# Patient Record
Sex: MALE | Race: WHITE | NOT HISPANIC OR LATINO | URBAN - METROPOLITAN AREA
[De-identification: names, ages, dates, MRNs, and addresses within clinical notes are randomized per-mention and may not be internally consistent; named-entity substitution may affect disease eponyms.]

---

## 2023-03-01 ENCOUNTER — OFFICE (OUTPATIENT)
Dept: URBAN - METROPOLITAN AREA CLINIC 26 | Facility: CLINIC | Age: 63
End: 2023-03-01

## 2023-03-01 VITALS
DIASTOLIC BLOOD PRESSURE: 70 MMHG | WEIGHT: 222 LBS | TEMPERATURE: 97.7 F | SYSTOLIC BLOOD PRESSURE: 134 MMHG | HEART RATE: 66 BPM | HEIGHT: 65 IN

## 2023-03-01 DIAGNOSIS — R11.0 NAUSEA: ICD-10-CM

## 2023-03-01 DIAGNOSIS — K62.5 HEMORRHAGE OF ANUS AND RECTUM: ICD-10-CM

## 2023-03-01 DIAGNOSIS — R19.4 CHANGE IN BOWEL HABIT: ICD-10-CM

## 2023-03-01 PROCEDURE — 99203 OFFICE O/P NEW LOW 30 MIN: CPT | Performed by: NURSE PRACTITIONER

## 2023-03-10 ENCOUNTER — OFFICE (OUTPATIENT)
Dept: URBAN - METROPOLITAN AREA PATHOLOGY 2 | Facility: PATHOLOGY | Age: 63
End: 2023-03-10

## 2023-03-10 ENCOUNTER — AMBULATORY SURGICAL CENTER (OUTPATIENT)
Dept: URBAN - METROPOLITAN AREA SURGERY 12 | Facility: SURGERY | Age: 63
End: 2023-03-10
Payer: COMMERCIAL

## 2023-03-10 ENCOUNTER — AMBULATORY SURGICAL CENTER (OUTPATIENT)
Dept: URBAN - METROPOLITAN AREA SURGERY 12 | Facility: SURGERY | Age: 63
End: 2023-03-10

## 2023-03-10 VITALS
RESPIRATION RATE: 13 BRPM | SYSTOLIC BLOOD PRESSURE: 104 MMHG | SYSTOLIC BLOOD PRESSURE: 151 MMHG | DIASTOLIC BLOOD PRESSURE: 57 MMHG | RESPIRATION RATE: 14 BRPM | DIASTOLIC BLOOD PRESSURE: 61 MMHG | DIASTOLIC BLOOD PRESSURE: 50 MMHG | DIASTOLIC BLOOD PRESSURE: 45 MMHG | DIASTOLIC BLOOD PRESSURE: 59 MMHG | OXYGEN SATURATION: 100 % | HEART RATE: 61 BPM | SYSTOLIC BLOOD PRESSURE: 110 MMHG | SYSTOLIC BLOOD PRESSURE: 138 MMHG | SYSTOLIC BLOOD PRESSURE: 120 MMHG | RESPIRATION RATE: 23 BRPM | RESPIRATION RATE: 23 BRPM | OXYGEN SATURATION: 92 % | HEART RATE: 60 BPM | DIASTOLIC BLOOD PRESSURE: 50 MMHG | DIASTOLIC BLOOD PRESSURE: 51 MMHG | OXYGEN SATURATION: 98 % | SYSTOLIC BLOOD PRESSURE: 132 MMHG | DIASTOLIC BLOOD PRESSURE: 80 MMHG | SYSTOLIC BLOOD PRESSURE: 150 MMHG | DIASTOLIC BLOOD PRESSURE: 62 MMHG | DIASTOLIC BLOOD PRESSURE: 57 MMHG | DIASTOLIC BLOOD PRESSURE: 59 MMHG | SYSTOLIC BLOOD PRESSURE: 138 MMHG | SYSTOLIC BLOOD PRESSURE: 107 MMHG | HEIGHT: 71 IN | RESPIRATION RATE: 18 BRPM | DIASTOLIC BLOOD PRESSURE: 61 MMHG | RESPIRATION RATE: 17 BRPM | SYSTOLIC BLOOD PRESSURE: 152 MMHG | HEART RATE: 62 BPM | RESPIRATION RATE: 25 BRPM | RESPIRATION RATE: 18 BRPM | OXYGEN SATURATION: 96 % | SYSTOLIC BLOOD PRESSURE: 141 MMHG | SYSTOLIC BLOOD PRESSURE: 157 MMHG | RESPIRATION RATE: 17 BRPM | SYSTOLIC BLOOD PRESSURE: 113 MMHG | RESPIRATION RATE: 10 BRPM | SYSTOLIC BLOOD PRESSURE: 132 MMHG | SYSTOLIC BLOOD PRESSURE: 102 MMHG | DIASTOLIC BLOOD PRESSURE: 50 MMHG | SYSTOLIC BLOOD PRESSURE: 110 MMHG | HEART RATE: 60 BPM | RESPIRATION RATE: 21 BRPM | SYSTOLIC BLOOD PRESSURE: 141 MMHG | OXYGEN SATURATION: 96 % | DIASTOLIC BLOOD PRESSURE: 47 MMHG | SYSTOLIC BLOOD PRESSURE: 150 MMHG | DIASTOLIC BLOOD PRESSURE: 65 MMHG | DIASTOLIC BLOOD PRESSURE: 64 MMHG | SYSTOLIC BLOOD PRESSURE: 104 MMHG | DIASTOLIC BLOOD PRESSURE: 70 MMHG | DIASTOLIC BLOOD PRESSURE: 65 MMHG | TEMPERATURE: 98.2 F | SYSTOLIC BLOOD PRESSURE: 152 MMHG | DIASTOLIC BLOOD PRESSURE: 61 MMHG | SYSTOLIC BLOOD PRESSURE: 102 MMHG | SYSTOLIC BLOOD PRESSURE: 107 MMHG | RESPIRATION RATE: 19 BRPM | RESPIRATION RATE: 21 BRPM | DIASTOLIC BLOOD PRESSURE: 65 MMHG | SYSTOLIC BLOOD PRESSURE: 124 MMHG | SYSTOLIC BLOOD PRESSURE: 101 MMHG | HEART RATE: 61 BPM | RESPIRATION RATE: 21 BRPM | SYSTOLIC BLOOD PRESSURE: 142 MMHG | RESPIRATION RATE: 18 BRPM | RESPIRATION RATE: 25 BRPM | DIASTOLIC BLOOD PRESSURE: 64 MMHG | DIASTOLIC BLOOD PRESSURE: 62 MMHG | DIASTOLIC BLOOD PRESSURE: 67 MMHG | DIASTOLIC BLOOD PRESSURE: 47 MMHG | DIASTOLIC BLOOD PRESSURE: 59 MMHG | SYSTOLIC BLOOD PRESSURE: 142 MMHG | RESPIRATION RATE: 11 BRPM | SYSTOLIC BLOOD PRESSURE: 152 MMHG | RESPIRATION RATE: 11 BRPM | SYSTOLIC BLOOD PRESSURE: 110 MMHG | HEART RATE: 60 BPM | RESPIRATION RATE: 24 BRPM | DIASTOLIC BLOOD PRESSURE: 53 MMHG | OXYGEN SATURATION: 92 % | DIASTOLIC BLOOD PRESSURE: 53 MMHG | RESPIRATION RATE: 28 BRPM | RESPIRATION RATE: 24 BRPM | RESPIRATION RATE: 16 BRPM | SYSTOLIC BLOOD PRESSURE: 151 MMHG | DIASTOLIC BLOOD PRESSURE: 51 MMHG | OXYGEN SATURATION: 97 % | HEART RATE: 61 BPM | SYSTOLIC BLOOD PRESSURE: 120 MMHG | DIASTOLIC BLOOD PRESSURE: 75 MMHG | OXYGEN SATURATION: 97 % | HEART RATE: 62 BPM | SYSTOLIC BLOOD PRESSURE: 164 MMHG | OXYGEN SATURATION: 100 % | SYSTOLIC BLOOD PRESSURE: 124 MMHG | RESPIRATION RATE: 14 BRPM | RESPIRATION RATE: 14 BRPM | SYSTOLIC BLOOD PRESSURE: 138 MMHG | DIASTOLIC BLOOD PRESSURE: 53 MMHG | DIASTOLIC BLOOD PRESSURE: 52 MMHG | SYSTOLIC BLOOD PRESSURE: 157 MMHG | SYSTOLIC BLOOD PRESSURE: 107 MMHG | SYSTOLIC BLOOD PRESSURE: 164 MMHG | OXYGEN SATURATION: 98 % | SYSTOLIC BLOOD PRESSURE: 157 MMHG | RESPIRATION RATE: 10 BRPM | DIASTOLIC BLOOD PRESSURE: 47 MMHG | TEMPERATURE: 98.2 F | OXYGEN SATURATION: 92 % | SYSTOLIC BLOOD PRESSURE: 101 MMHG | SYSTOLIC BLOOD PRESSURE: 151 MMHG | SYSTOLIC BLOOD PRESSURE: 104 MMHG | SYSTOLIC BLOOD PRESSURE: 124 MMHG | SYSTOLIC BLOOD PRESSURE: 164 MMHG | RESPIRATION RATE: 17 BRPM | DIASTOLIC BLOOD PRESSURE: 70 MMHG | RESPIRATION RATE: 23 BRPM | DIASTOLIC BLOOD PRESSURE: 55 MMHG | SYSTOLIC BLOOD PRESSURE: 120 MMHG | SYSTOLIC BLOOD PRESSURE: 142 MMHG | SYSTOLIC BLOOD PRESSURE: 141 MMHG | HEART RATE: 64 BPM | SYSTOLIC BLOOD PRESSURE: 146 MMHG | OXYGEN SATURATION: 99 % | RESPIRATION RATE: 10 BRPM | DIASTOLIC BLOOD PRESSURE: 67 MMHG | WEIGHT: 215 LBS | WEIGHT: 215 LBS | RESPIRATION RATE: 19 BRPM | RESPIRATION RATE: 28 BRPM | RESPIRATION RATE: 11 BRPM | WEIGHT: 215 LBS | DIASTOLIC BLOOD PRESSURE: 67 MMHG | DIASTOLIC BLOOD PRESSURE: 45 MMHG | DIASTOLIC BLOOD PRESSURE: 52 MMHG | OXYGEN SATURATION: 99 % | SYSTOLIC BLOOD PRESSURE: 132 MMHG | OXYGEN SATURATION: 98 % | RESPIRATION RATE: 28 BRPM | SYSTOLIC BLOOD PRESSURE: 150 MMHG | SYSTOLIC BLOOD PRESSURE: 146 MMHG | HEART RATE: 64 BPM | OXYGEN SATURATION: 99 % | DIASTOLIC BLOOD PRESSURE: 75 MMHG | TEMPERATURE: 98.2 F | DIASTOLIC BLOOD PRESSURE: 52 MMHG | OXYGEN SATURATION: 96 % | DIASTOLIC BLOOD PRESSURE: 64 MMHG | RESPIRATION RATE: 24 BRPM | OXYGEN SATURATION: 100 % | HEART RATE: 64 BPM | SYSTOLIC BLOOD PRESSURE: 101 MMHG | HEART RATE: 62 BPM | DIASTOLIC BLOOD PRESSURE: 57 MMHG | DIASTOLIC BLOOD PRESSURE: 55 MMHG | DIASTOLIC BLOOD PRESSURE: 62 MMHG | SYSTOLIC BLOOD PRESSURE: 113 MMHG | DIASTOLIC BLOOD PRESSURE: 70 MMHG | HEIGHT: 71 IN | SYSTOLIC BLOOD PRESSURE: 113 MMHG | SYSTOLIC BLOOD PRESSURE: 146 MMHG | RESPIRATION RATE: 16 BRPM | RESPIRATION RATE: 13 BRPM | DIASTOLIC BLOOD PRESSURE: 80 MMHG | DIASTOLIC BLOOD PRESSURE: 80 MMHG | HEIGHT: 71 IN | RESPIRATION RATE: 13 BRPM | DIASTOLIC BLOOD PRESSURE: 75 MMHG | OXYGEN SATURATION: 97 % | DIASTOLIC BLOOD PRESSURE: 55 MMHG | DIASTOLIC BLOOD PRESSURE: 51 MMHG | RESPIRATION RATE: 16 BRPM | RESPIRATION RATE: 25 BRPM | DIASTOLIC BLOOD PRESSURE: 45 MMHG | RESPIRATION RATE: 19 BRPM | SYSTOLIC BLOOD PRESSURE: 102 MMHG

## 2023-03-10 DIAGNOSIS — K57.30 DIVERTICULOSIS OF LARGE INTESTINE WITHOUT PERFORATION OR ABS: ICD-10-CM

## 2023-03-10 DIAGNOSIS — Z86.010 PERSONAL HISTORY OF COLONIC POLYPS: ICD-10-CM

## 2023-03-10 DIAGNOSIS — K64.8 OTHER HEMORRHOIDS: ICD-10-CM

## 2023-03-10 DIAGNOSIS — K22.70 BARRETT'S ESOPHAGUS WITHOUT DYSPLASIA: ICD-10-CM

## 2023-03-10 DIAGNOSIS — R11.0 NAUSEA: ICD-10-CM

## 2023-03-10 DIAGNOSIS — D12.0 BENIGN NEOPLASM OF CECUM: ICD-10-CM

## 2023-03-10 DIAGNOSIS — K31.89 OTHER DISEASES OF STOMACH AND DUODENUM: ICD-10-CM

## 2023-03-10 DIAGNOSIS — K62.5 HEMORRHAGE OF ANUS AND RECTUM: ICD-10-CM

## 2023-03-10 DIAGNOSIS — D12.5 BENIGN NEOPLASM OF SIGMOID COLON: ICD-10-CM

## 2023-03-10 DIAGNOSIS — K22.2 ESOPHAGEAL OBSTRUCTION: ICD-10-CM

## 2023-03-10 DIAGNOSIS — D12.2 BENIGN NEOPLASM OF ASCENDING COLON: ICD-10-CM

## 2023-03-10 DIAGNOSIS — K44.9 DIAPHRAGMATIC HERNIA WITHOUT OBSTRUCTION OR GANGRENE: ICD-10-CM

## 2023-03-10 PROBLEM — K63.5 POLYP OF COLON: Status: ACTIVE | Noted: 2023-03-10

## 2023-03-10 PROBLEM — K22.89 OTHER SPECIFIED DISEASE OF ESOPHAGUS: Status: ACTIVE | Noted: 2023-03-10

## 2023-03-10 PROCEDURE — 43450 DILATE ESOPHAGUS 1/MULT PASS: CPT | Performed by: INTERNAL MEDICINE

## 2023-03-10 PROCEDURE — 88305 TISSUE EXAM BY PATHOLOGIST: CPT | Performed by: PATHOLOGY

## 2023-03-10 PROCEDURE — 45380 COLONOSCOPY AND BIOPSY: CPT | Mod: 59 | Performed by: INTERNAL MEDICINE

## 2023-03-10 PROCEDURE — 43239 EGD BIOPSY SINGLE/MULTIPLE: CPT | Mod: 51 | Performed by: INTERNAL MEDICINE

## 2023-03-10 PROCEDURE — 45385 COLONOSCOPY W/LESION REMOVAL: CPT | Performed by: INTERNAL MEDICINE

## 2023-03-10 PROCEDURE — 88313 SPECIAL STAINS GROUP 2: CPT | Performed by: PATHOLOGY

## 2023-03-10 PROCEDURE — 88342 IMHCHEM/IMCYTCHM 1ST ANTB: CPT | Performed by: PATHOLOGY

## 2023-03-10 RX ORDER — SUCRALFATE 1 G/1
1 TABLET ORAL
Qty: 90 | Refills: 1 | Status: ACTIVE
Start: 2023-03-10

## 2023-08-10 LAB
ALANINE AMINOTRANSFERASE (SGPT) (U/L) IN SER/PLAS: 15 U/L (ref 10–52)
ALBUMIN (G/DL) IN SER/PLAS: 4.2 G/DL (ref 3.4–5)
ALKALINE PHOSPHATASE (U/L) IN SER/PLAS: 106 U/L (ref 33–136)
ANION GAP IN SER/PLAS: 13 MMOL/L (ref 10–20)
ASPARTATE AMINOTRANSFERASE (SGOT) (U/L) IN SER/PLAS: 17 U/L (ref 9–39)
BASOPHILS (10*3/UL) IN BLOOD BY AUTOMATED COUNT: 0.03 X10E9/L (ref 0–0.1)
BASOPHILS/100 LEUKOCYTES IN BLOOD BY AUTOMATED COUNT: 0.6 % (ref 0–2)
BILIRUBIN TOTAL (MG/DL) IN SER/PLAS: 0.6 MG/DL (ref 0–1.2)
CALCIUM (MG/DL) IN SER/PLAS: 9.9 MG/DL (ref 8.6–10.6)
CARBON DIOXIDE, TOTAL (MMOL/L) IN SER/PLAS: 27 MMOL/L (ref 21–32)
CHLORIDE (MMOL/L) IN SER/PLAS: 106 MMOL/L (ref 98–107)
CHOLESTEROL (MG/DL) IN SER/PLAS: 182 MG/DL (ref 0–199)
CHOLESTEROL IN HDL (MG/DL) IN SER/PLAS: 100.1 MG/DL
CHOLESTEROL/HDL RATIO: 1.8
CREATININE (MG/DL) IN SER/PLAS: 1.01 MG/DL (ref 0.5–1.3)
EOSINOPHILS (10*3/UL) IN BLOOD BY AUTOMATED COUNT: 0.27 X10E9/L (ref 0–0.7)
EOSINOPHILS/100 LEUKOCYTES IN BLOOD BY AUTOMATED COUNT: 5.2 % (ref 0–6)
ERYTHROCYTE DISTRIBUTION WIDTH (RATIO) BY AUTOMATED COUNT: 16.6 % (ref 11.5–14.5)
ERYTHROCYTE MEAN CORPUSCULAR HEMOGLOBIN CONCENTRATION (G/DL) BY AUTOMATED: 32.3 G/DL (ref 32–36)
ERYTHROCYTE MEAN CORPUSCULAR VOLUME (FL) BY AUTOMATED COUNT: 92 FL (ref 80–100)
ERYTHROCYTES (10*6/UL) IN BLOOD BY AUTOMATED COUNT: 4.86 X10E12/L (ref 4.5–5.9)
GFR MALE: 83 ML/MIN/1.73M2
GLUCOSE (MG/DL) IN SER/PLAS: 101 MG/DL (ref 74–99)
HEMATOCRIT (%) IN BLOOD BY AUTOMATED COUNT: 44.9 % (ref 41–52)
HEMOGLOBIN (G/DL) IN BLOOD: 14.5 G/DL (ref 13.5–17.5)
IMMATURE GRANULOCYTES/100 LEUKOCYTES IN BLOOD BY AUTOMATED COUNT: 0.2 % (ref 0–0.9)
LDL: 68 MG/DL (ref 0–99)
LEUKOCYTES (10*3/UL) IN BLOOD BY AUTOMATED COUNT: 5.2 X10E9/L (ref 4.4–11.3)
LYMPHOCYTES (10*3/UL) IN BLOOD BY AUTOMATED COUNT: 0.98 X10E9/L (ref 1.2–4.8)
LYMPHOCYTES/100 LEUKOCYTES IN BLOOD BY AUTOMATED COUNT: 19 % (ref 13–44)
MONOCYTES (10*3/UL) IN BLOOD BY AUTOMATED COUNT: 0.65 X10E9/L (ref 0.1–1)
MONOCYTES/100 LEUKOCYTES IN BLOOD BY AUTOMATED COUNT: 12.6 % (ref 2–10)
NEUTROPHILS (10*3/UL) IN BLOOD BY AUTOMATED COUNT: 3.21 X10E9/L (ref 1.2–7.7)
NEUTROPHILS/100 LEUKOCYTES IN BLOOD BY AUTOMATED COUNT: 62.4 % (ref 40–80)
NRBC (PER 100 WBCS) BY AUTOMATED COUNT: 0 /100 WBC (ref 0–0)
PLATELETS (10*3/UL) IN BLOOD AUTOMATED COUNT: 147 X10E9/L (ref 150–450)
POTASSIUM (MMOL/L) IN SER/PLAS: 5.1 MMOL/L (ref 3.5–5.3)
PROSTATE SPECIFIC AG (NG/ML) IN SER/PLAS: 0.72 NG/ML (ref 0–4)
PROTEIN TOTAL: 7 G/DL (ref 6.4–8.2)
SODIUM (MMOL/L) IN SER/PLAS: 141 MMOL/L (ref 136–145)
THYROTROPIN (MIU/L) IN SER/PLAS BY DETECTION LIMIT <= 0.05 MIU/L: 1.46 MIU/L (ref 0.44–3.98)
TRIGLYCERIDE (MG/DL) IN SER/PLAS: 68 MG/DL (ref 0–149)
UREA NITROGEN (MG/DL) IN SER/PLAS: 22 MG/DL (ref 6–23)
VLDL: 14 MG/DL (ref 0–40)

## 2023-11-06 ENCOUNTER — HOSPITAL ENCOUNTER (OUTPATIENT)
Dept: CARDIOLOGY | Facility: CLINIC | Age: 63
Discharge: HOME | End: 2023-11-06
Payer: MEDICARE

## 2023-11-06 DIAGNOSIS — I44.2 CHB (COMPLETE HEART BLOCK) (MULTI): ICD-10-CM

## 2023-11-06 DIAGNOSIS — Z95.0 CARDIAC PACEMAKER IN SITU: ICD-10-CM

## 2023-11-08 ENCOUNTER — HOSPITAL ENCOUNTER (OUTPATIENT)
Dept: CARDIOLOGY | Facility: CLINIC | Age: 63
Discharge: HOME | End: 2023-11-08
Payer: MEDICARE

## 2023-11-08 DIAGNOSIS — Z95.0 PRESENCE OF CARDIAC PACEMAKER: ICD-10-CM

## 2023-11-08 PROCEDURE — 93280 PM DEVICE PROGR EVAL DUAL: CPT | Performed by: INTERNAL MEDICINE

## 2023-11-08 PROCEDURE — 93290 INTERROG DEV EVAL ICPMS IP: CPT | Performed by: INTERNAL MEDICINE

## 2023-11-08 PROCEDURE — 93290 INTERROG DEV EVAL ICPMS IP: CPT

## 2023-12-14 ENCOUNTER — HOSPITAL ENCOUNTER (OUTPATIENT)
Dept: RADIOLOGY | Facility: HOSPITAL | Age: 63
Discharge: HOME | End: 2023-12-14
Payer: MEDICARE

## 2023-12-14 DIAGNOSIS — R52 PAIN, UNSPECIFIED: ICD-10-CM

## 2023-12-14 PROCEDURE — 73502 X-RAY EXAM HIP UNI 2-3 VIEWS: CPT | Mod: RIGHT SIDE | Performed by: RADIOLOGY

## 2023-12-14 PROCEDURE — 73502 X-RAY EXAM HIP UNI 2-3 VIEWS: CPT | Mod: RT

## 2023-12-21 PROBLEM — R59.0 MEDIASTINAL LYMPHADENOPATHY: Status: ACTIVE | Noted: 2023-12-21

## 2023-12-21 PROBLEM — I71.20 THORACIC AORTIC ANEURYSM (CMS-HCC): Status: ACTIVE | Noted: 2023-12-21

## 2023-12-21 PROBLEM — I26.99 PULMONARY EMBOLISM WITHOUT ACUTE COR PULMONALE (MULTI): Status: ACTIVE | Noted: 2023-12-21

## 2023-12-21 PROBLEM — K21.9 GERD (GASTROESOPHAGEAL REFLUX DISEASE): Status: ACTIVE | Noted: 2023-12-21

## 2023-12-21 PROBLEM — I44.2 COMPLETE HEART BLOCK (MULTI): Status: ACTIVE | Noted: 2023-12-21

## 2023-12-21 PROBLEM — I35.0 AORTIC STENOSIS: Chronic | Status: ACTIVE | Noted: 2023-12-21

## 2023-12-21 PROBLEM — Z95.0 PACEMAKER: Chronic | Status: ACTIVE | Noted: 2023-12-21

## 2023-12-21 PROBLEM — I71.20 THORACIC AORTIC ANEURYSM (CMS-HCC): Chronic | Status: ACTIVE | Noted: 2023-12-21

## 2023-12-21 PROBLEM — I35.0 MILD AORTIC STENOSIS: Status: ACTIVE | Noted: 2023-12-21

## 2023-12-21 PROBLEM — I26.99 PULMONARY EMBOLISM WITHOUT ACUTE COR PULMONALE (MULTI): Chronic | Status: ACTIVE | Noted: 2023-12-21

## 2023-12-21 PROBLEM — I44.7 LEFT BUNDLE BRANCH BLOCK (LBBB): Status: ACTIVE | Noted: 2023-12-21

## 2023-12-21 PROBLEM — Z95.0 PACEMAKER: Status: ACTIVE | Noted: 2023-12-21

## 2024-01-03 ENCOUNTER — TELEPHONE (OUTPATIENT)
Dept: CARDIOLOGY | Facility: CLINIC | Age: 64
End: 2024-01-03

## 2024-01-03 ENCOUNTER — OFFICE VISIT (OUTPATIENT)
Dept: CARDIOLOGY | Facility: CLINIC | Age: 64
End: 2024-01-03
Payer: MEDICARE

## 2024-01-03 VITALS
OXYGEN SATURATION: 94 % | BODY MASS INDEX: 33.29 KG/M2 | DIASTOLIC BLOOD PRESSURE: 82 MMHG | SYSTOLIC BLOOD PRESSURE: 138 MMHG | WEIGHT: 232 LBS | HEART RATE: 83 BPM

## 2024-01-03 DIAGNOSIS — I71.21 ANEURYSM OF ASCENDING AORTA WITHOUT RUPTURE (CMS-HCC): Primary | Chronic | ICD-10-CM

## 2024-01-03 DIAGNOSIS — I44.2 COMPLETE HEART BLOCK (MULTI): Chronic | ICD-10-CM

## 2024-01-03 DIAGNOSIS — I10 BENIGN HYPERTENSION: Chronic | ICD-10-CM

## 2024-01-03 DIAGNOSIS — Z95.0 PACEMAKER: Chronic | ICD-10-CM

## 2024-01-03 DIAGNOSIS — R60.0 BILATERAL LEG EDEMA: Chronic | ICD-10-CM

## 2024-01-03 PROBLEM — I44.7 LEFT BUNDLE BRANCH BLOCK (LBBB): Chronic | Status: ACTIVE | Noted: 2023-12-21

## 2024-01-03 PROCEDURE — 3079F DIAST BP 80-89 MM HG: CPT | Performed by: INTERNAL MEDICINE

## 2024-01-03 PROCEDURE — 99214 OFFICE O/P EST MOD 30 MIN: CPT | Performed by: INTERNAL MEDICINE

## 2024-01-03 PROCEDURE — 3075F SYST BP GE 130 - 139MM HG: CPT | Performed by: INTERNAL MEDICINE

## 2024-01-03 PROCEDURE — 1159F MED LIST DOCD IN RCRD: CPT | Performed by: INTERNAL MEDICINE

## 2024-01-03 PROCEDURE — 93000 ELECTROCARDIOGRAM COMPLETE: CPT | Performed by: INTERNAL MEDICINE

## 2024-01-03 PROCEDURE — 1036F TOBACCO NON-USER: CPT | Performed by: INTERNAL MEDICINE

## 2024-01-03 PROCEDURE — 1160F RVW MEDS BY RX/DR IN RCRD: CPT | Performed by: INTERNAL MEDICINE

## 2024-01-03 RX ORDER — HYDROCHLOROTHIAZIDE 12.5 MG/1
12.5 TABLET ORAL DAILY
Qty: 90 TABLET | Refills: 3 | Status: SHIPPED | OUTPATIENT
Start: 2024-01-03

## 2024-01-03 RX ORDER — DICLOFENAC SODIUM 75 MG/1
75 TABLET, DELAYED RELEASE ORAL 2 TIMES DAILY
COMMUNITY
Start: 2024-01-02 | End: 2024-01-22

## 2024-01-03 RX ORDER — LOSARTAN POTASSIUM 100 MG/1
100 TABLET ORAL DAILY
COMMUNITY
Start: 2023-10-30

## 2024-01-03 RX ORDER — ASPIRIN 81 MG/1
81 TABLET ORAL ONCE
COMMUNITY

## 2024-01-03 RX ORDER — AMLODIPINE BESYLATE 10 MG/1
10 TABLET ORAL DAILY
COMMUNITY
Start: 2023-10-30

## 2024-01-03 RX ORDER — HYDROCHLOROTHIAZIDE 12.5 MG/1
12.5 TABLET ORAL DAILY
COMMUNITY
End: 2024-01-03 | Stop reason: SDUPTHER

## 2024-01-03 RX ORDER — PANTOPRAZOLE SODIUM 40 MG/1
40 TABLET, DELAYED RELEASE ORAL DAILY
COMMUNITY
Start: 2023-10-27

## 2024-01-03 NOTE — TELEPHONE ENCOUNTER
Patient was seen by Dr. Tripathi today and patient states that hydrochlorothiazide was going to be called into the pharmacy and nothing has been sent.  It is in the note but not in the med list.

## 2024-01-03 NOTE — PATIENT INSTRUCTIONS
1. Complete heart block. Left bundle branch block. Status post pacemaker placement 2019. Doing well. Followed by Dr. Ramicone.     2. Thoracic aortic aneurysm. 3.9 cm on the CT scan 4.2 cm on echo.  His repeat echo 2021 September revealed a 4 cm aneurysm.  This should not be repeated sometime this year.      3. Aortic stenosis. Mostly sclerosis. Reassess on his echo now.  On examination this is minimal     4. Hyperlipidemia. Followed by Dr. Agarwal.  8/10/2023 LDL 68  triglycerides 68     5. Hypertension. Well-controlled with losartan.  He is also on amlodipine.  He does have lower extremity edema.  This is likely combination of weight and amlodipine which she was not on when I had seen him in 2021.  Adding a diuretic such as hydrochlorothiazide would be reasonable.     6. Pulmonary embolism. This was discovered incidentally in March 2019 on a CT scan ordered by pulmonary medicine.  Currently off of Xarelto per pulmonary     EKG today.  Echo sometime in the near future.  He will call 1 week afterwards to discuss results.  Add hydrochlorothiazide 12.5 mg to his regimen.  BMP 1 month.  MARISEL follow-up 2 months.  See me back 1 year.

## 2024-01-03 NOTE — PROGRESS NOTES
Referred by No ref. provider found    HPI I am seeing Terrance for the first time since August 2021.  He had issues with his back which is limited his mobility.  His weight is gone up approximately 20 pounds.  He has had no chest pain or pressure no significant palpitations.  Rare shortness of breath.  On exam he does have lower extremity edema which he was not aware of.    Past Medical History:  Problem List Items Addressed This Visit    None       Past Medical History:   Diagnosis Date    Aortic stenosis 12/21/2023    mild    Overweight     Overweight (BMI 25.0-29.9)    Pacemaker 12/21/2023    [employer] … implanted January 2019 for complete heart block    Personal history of other diseases of the digestive system     History of gastroesophageal reflux (GERD)    Personal history of other specified conditions     History of shortness of breath    Pulmonary embolism without acute cor pulmonale (CMS/[facility]) 12/21/2023    Found incidentally on CAT scan ordered by Pulmonary    Thoracic aortic aneurysm (CMS/[facility]) 12/21/2023    4 cm     Thoracic aortic aneurysm, without rupture, unspecified (CMS/[facility]) 08/26/2021    Thoracic aortic aneurysm             Past Surgical History:  He has a past surgical history that includes Appendectomy (10/02/2017) and Other surgical history (08/12/2021).      Social History:  He has no history on file for tobacco use, alcohol use, and drug use.    Family History:  No family history on file.     Allergies:  Patient has no known allergies.    Outpatient Medications:  No current outpatient medications     Last Recorded Vitals:  There were no vitals filed for this visit.    Physical Exam    Physical  Patient is alert and oriented x3.  HEENT is unremarkable mucous members are moist  Neck no JVP no bruits upstrokes are full no thyromegaly  Lungs are clear bilaterally.  No wheezing crackles or rales  Heart regular rhythm normal S1-S2 there is no S3 no murmurs are heard.  Abdomen is soft vessels are positive  nontender nondistended no organomegaly no pulsatile masses  Extremities have 1+ edema.  Distal pulses present palpable.  Neuro is grossly nonfocal  Skin has no rashes     Last Labs:  CBC -  Lab Results   Component Value Date    WBC 5.2 08/10/2023    HGB 14.5 08/10/2023    HCT 44.9 08/10/2023    MCV 92 08/10/2023     (L) 08/10/2023       CMP -  Lab Results   Component Value Date    CALCIUM 9.9 08/10/2023    PHOS 3.3 01/26/2019    PROT 7.0 08/10/2023    ALBUMIN 4.2 08/10/2023    AST 17 08/10/2023    ALT 15 08/10/2023    ALKPHOS 106 08/10/2023    BILITOT 0.6 08/10/2023       LIPID PANEL -   Lab Results   Component Value Date    CHOL 182 08/10/2023    .1 08/10/2023    CHHDL 1.8 08/10/2023    VLDL 14 08/10/2023    TRIG 68 08/10/2023       RENAL FUNCTION PANEL -   Lab Results   Component Value Date    K 5.1 08/10/2023    PHOS 3.3 01/26/2019       Lab Results   Component Value Date     (H) 01/26/2019     Procedure    Echo 9/14/2021 EF 50%, DDF, mild pulm hypertension, thoracic aorta 4.1 cm    VENOUS U/S [03/08/2019]: Negative for DVT     CTA (03/07/2019): Pulm embolism w/in subsegmental branch of R-lower lobe pulm. artery, New from (1/26/19) New subsegmental 3-in-bud ground glass opacities in L-lung suspicious for developing viral/atypical pneumonia. Status post L-chest wall pacemaker/AICD w/out definitive evidence Pulm arterial pressure. Stable nonspecific mediastinal/hilar adenopathy.     ECHO (01/28/2019): Normal LVF 55-60% EF. Mildly elev RVSP. AR measures 3.7 cm. Arterial septal aneurysm prsent.     CTA [01/26/2019]: Aorta 3.9 cm, persistent mediastinal lymphadenopathy     ECHO [09/2017]: EF 50%, aortic sclerosis, aorta 4.2 cm     ECHO [01/17/2014]: Normal left ventricular size with concentric left ventricular hypertrophy and normal left ventricular systolic function. Estimated left ventricular ejection fraction is 60%. Mild left atrial enlargement. Mild aortic stenosis with a trileaflet aortic  valve with trace regurgitation. Dilated ascending aorta, measuring 4.3 centimeters. Mild pulmonary hypertension.     EX NST [09/26/2014]: 10 min 56 sec (13.2 METs) ... Normal, EF 54%          Assessment/Plan   1. Complete heart block. Left bundle branch block. Status post pacemaker placement 2019. Doing well. Followed by Dr. Ramicone.     2. Thoracic aortic aneurysm. 3.9 cm on the CT scan 4.2 cm on echo.  His repeat echo 2021 September revealed a 4 cm aneurysm.  This should not be repeated sometime this year.      3. Aortic stenosis. Mostly sclerosis. Reassess on his echo now.  On examination this is minimal     4. Hyperlipidemia. Followed by Dr. Agarwal.  8/10/2023 LDL 68  triglycerides 68     5. Hypertension. Well-controlled with losartan.  He is also on amlodipine.  He does have lower extremity edema.  This is likely combination of weight and amlodipine which she was not on when I had seen him in 2021.  Adding a diuretic such as hydrochlorothiazide would be reasonable.     6. Pulmonary embolism. This was discovered incidentally in March 2019 on a CT scan ordered by pulmonary medicine.  Currently off of Xarelto per pulmonary     EKG today.  Echo sometime in the near future.  He will call 1 week afterwards to discuss results.  Add hydrochlorothiazide 12.5 mg to his regimen.  BMP 1 month.  MARISEL follow-up 2 months.  See me back 1 year.    Nikos Tripathi MD     Instructions and follow up

## 2024-01-16 ENCOUNTER — HOSPITAL ENCOUNTER (OUTPATIENT)
Dept: CARDIOLOGY | Facility: CLINIC | Age: 64
Discharge: HOME | End: 2024-01-16
Payer: MEDICARE

## 2024-01-16 VITALS
WEIGHT: 212 LBS | HEIGHT: 70 IN | DIASTOLIC BLOOD PRESSURE: 82 MMHG | SYSTOLIC BLOOD PRESSURE: 138 MMHG | BODY MASS INDEX: 30.35 KG/M2

## 2024-01-16 DIAGNOSIS — R60.0 BILATERAL LEG EDEMA: Chronic | ICD-10-CM

## 2024-01-16 DIAGNOSIS — I71.21 ANEURYSM OF ASCENDING AORTA WITHOUT RUPTURE (CMS-HCC): Chronic | ICD-10-CM

## 2024-01-16 DIAGNOSIS — I71.20 THORACIC AORTIC ANEURYSM, WITHOUT RUPTURE, UNSPECIFIED (CMS-HCC): ICD-10-CM

## 2024-01-16 DIAGNOSIS — I10 BENIGN HYPERTENSION: Chronic | ICD-10-CM

## 2024-01-16 PROCEDURE — 93306 TTE W/DOPPLER COMPLETE: CPT

## 2024-01-16 PROCEDURE — 93306 TTE W/DOPPLER COMPLETE: CPT | Performed by: INTERNAL MEDICINE

## 2024-01-18 LAB
AORTIC VALVE MEAN GRADIENT: 2.9
AORTIC VALVE PEAK VELOCITY: 1.22
AV PEAK GRADIENT: 5.9
AVA (PEAK VEL): 2.21
AVA (VTI): 2.56
EJECTION FRACTION APICAL 4 CHAMBER: 58.5
EJECTION FRACTION: 61
LEFT ATRIUM VOLUME AREA LENGTH INDEX BSA: 32.3
LEFT VENTRICLE INTERNAL DIMENSION DIASTOLE: 6.24 (ref 3.5–6)
LEFT VENTRICULAR OUTFLOW TRACT DIAMETER: 2.19
MITRAL VALVE E/A RATIO: 0.66
RIGHT VENTRICLE FREE WALL PEAK S': 0.2
RIGHT VENTRICLE PEAK SYSTOLIC PRESSURE: 42.8
TRICUSPID ANNULAR PLANE SYSTOLIC EXCURSION: 3.5

## 2024-01-24 ENCOUNTER — APPOINTMENT (OUTPATIENT)
Dept: CARDIOLOGY | Facility: CLINIC | Age: 64
End: 2024-01-24
Payer: MEDICARE

## 2024-02-07 ENCOUNTER — APPOINTMENT (OUTPATIENT)
Dept: CARDIOLOGY | Facility: CLINIC | Age: 64
End: 2024-02-07
Payer: MEDICARE

## 2024-02-07 ENCOUNTER — LAB (OUTPATIENT)
Dept: LAB | Facility: LAB | Age: 64
End: 2024-02-07
Payer: MEDICARE

## 2024-02-07 DIAGNOSIS — I71.21 ANEURYSM OF ASCENDING AORTA WITHOUT RUPTURE (CMS-HCC): Chronic | ICD-10-CM

## 2024-02-07 DIAGNOSIS — I10 BENIGN HYPERTENSION: Chronic | ICD-10-CM

## 2024-02-07 DIAGNOSIS — R60.0 BILATERAL LEG EDEMA: Chronic | ICD-10-CM

## 2024-02-07 LAB
ANION GAP SERPL CALC-SCNC: 13 MMOL/L (ref 10–20)
BUN SERPL-MCNC: 21 MG/DL (ref 6–23)
CALCIUM SERPL-MCNC: 9.1 MG/DL (ref 8.6–10.3)
CHLORIDE SERPL-SCNC: 106 MMOL/L (ref 98–107)
CO2 SERPL-SCNC: 24 MMOL/L (ref 21–32)
CREAT SERPL-MCNC: 1.19 MG/DL (ref 0.5–1.3)
EGFRCR SERPLBLD CKD-EPI 2021: 68 ML/MIN/1.73M*2
GLUCOSE SERPL-MCNC: 97 MG/DL (ref 74–99)
POTASSIUM SERPL-SCNC: 4.4 MMOL/L (ref 3.5–5.3)
SODIUM SERPL-SCNC: 139 MMOL/L (ref 136–145)

## 2024-02-07 PROCEDURE — 80048 BASIC METABOLIC PNL TOTAL CA: CPT

## 2024-02-07 PROCEDURE — 36415 COLL VENOUS BLD VENIPUNCTURE: CPT

## 2024-02-21 ENCOUNTER — LAB (OUTPATIENT)
Dept: LAB | Facility: LAB | Age: 64
End: 2024-02-21
Payer: MEDICARE

## 2024-02-21 DIAGNOSIS — Z13.29 ENCOUNTER FOR SCREENING FOR OTHER SUSPECTED ENDOCRINE DISORDER: ICD-10-CM

## 2024-02-21 DIAGNOSIS — I10 ESSENTIAL (PRIMARY) HYPERTENSION: Primary | ICD-10-CM

## 2024-02-21 LAB
ALBUMIN SERPL BCP-MCNC: 4.2 G/DL (ref 3.4–5)
ALP SERPL-CCNC: 105 U/L (ref 33–136)
ALT SERPL W P-5'-P-CCNC: 19 U/L (ref 10–52)
ANION GAP SERPL CALC-SCNC: 17 MMOL/L (ref 10–20)
AST SERPL W P-5'-P-CCNC: 19 U/L (ref 9–39)
BASOPHILS # BLD AUTO: 0.03 X10*3/UL (ref 0–0.1)
BASOPHILS NFR BLD AUTO: 0.5 %
BILIRUB SERPL-MCNC: 0.9 MG/DL (ref 0–1.2)
BUN SERPL-MCNC: 20 MG/DL (ref 6–23)
CALCIUM SERPL-MCNC: 9.6 MG/DL (ref 8.6–10.6)
CHLORIDE SERPL-SCNC: 103 MMOL/L (ref 98–107)
CHOLEST SERPL-MCNC: 184 MG/DL (ref 0–199)
CHOLESTEROL/HDL RATIO: 2
CO2 SERPL-SCNC: 24 MMOL/L (ref 21–32)
CREAT SERPL-MCNC: 1.18 MG/DL (ref 0.5–1.3)
EGFRCR SERPLBLD CKD-EPI 2021: 68 ML/MIN/1.73M*2
EOSINOPHIL # BLD AUTO: 0.24 X10*3/UL (ref 0–0.7)
EOSINOPHIL NFR BLD AUTO: 3.7 %
ERYTHROCYTE [DISTWIDTH] IN BLOOD BY AUTOMATED COUNT: 14.3 % (ref 11.5–14.5)
GLUCOSE SERPL-MCNC: 90 MG/DL (ref 74–99)
HCT VFR BLD AUTO: 45.2 % (ref 41–52)
HDLC SERPL-MCNC: 91.8 MG/DL
HGB BLD-MCNC: 15.4 G/DL (ref 13.5–17.5)
IMM GRANULOCYTES # BLD AUTO: 0.03 X10*3/UL (ref 0–0.7)
IMM GRANULOCYTES NFR BLD AUTO: 0.5 % (ref 0–0.9)
LDLC SERPL CALC-MCNC: 79 MG/DL
LYMPHOCYTES # BLD AUTO: 0.9 X10*3/UL (ref 1.2–4.8)
LYMPHOCYTES NFR BLD AUTO: 14 %
MCH RBC QN AUTO: 32.2 PG (ref 26–34)
MCHC RBC AUTO-ENTMCNC: 34.1 G/DL (ref 32–36)
MCV RBC AUTO: 94 FL (ref 80–100)
MONOCYTES # BLD AUTO: 0.75 X10*3/UL (ref 0.1–1)
MONOCYTES NFR BLD AUTO: 11.6 %
NEUTROPHILS # BLD AUTO: 4.5 X10*3/UL (ref 1.2–7.7)
NEUTROPHILS NFR BLD AUTO: 69.7 %
NON HDL CHOLESTEROL: 92 MG/DL (ref 0–149)
NRBC BLD-RTO: 0 /100 WBCS (ref 0–0)
PLATELET # BLD AUTO: 157 X10*3/UL (ref 150–450)
POTASSIUM SERPL-SCNC: 4.8 MMOL/L (ref 3.5–5.3)
PROT SERPL-MCNC: 6.9 G/DL (ref 6.4–8.2)
RBC # BLD AUTO: 4.79 X10*6/UL (ref 4.5–5.9)
SODIUM SERPL-SCNC: 139 MMOL/L (ref 136–145)
TRIGL SERPL-MCNC: 65 MG/DL (ref 0–149)
TSH SERPL-ACNC: 1.97 MIU/L (ref 0.44–3.98)
VLDL: 13 MG/DL (ref 0–40)
WBC # BLD AUTO: 6.5 X10*3/UL (ref 4.4–11.3)

## 2024-02-21 PROCEDURE — 80053 COMPREHEN METABOLIC PANEL: CPT

## 2024-02-21 PROCEDURE — 80061 LIPID PANEL: CPT

## 2024-02-21 PROCEDURE — 36415 COLL VENOUS BLD VENIPUNCTURE: CPT

## 2024-02-21 PROCEDURE — 84443 ASSAY THYROID STIM HORMONE: CPT

## 2024-02-21 PROCEDURE — 85025 COMPLETE CBC W/AUTO DIFF WBC: CPT

## 2024-03-08 PROBLEM — M54.41 ACUTE RIGHT-SIDED LOW BACK PAIN WITH RIGHT-SIDED SCIATICA: Status: ACTIVE | Noted: 2024-01-10

## 2024-03-08 PROBLEM — M51.369 LUMBAR DEGENERATIVE DISC DISEASE: Status: ACTIVE | Noted: 2024-01-10

## 2024-03-08 PROBLEM — M51.36 LUMBAR DEGENERATIVE DISC DISEASE: Status: ACTIVE | Noted: 2024-01-10

## 2024-03-13 ENCOUNTER — OFFICE VISIT (OUTPATIENT)
Dept: CARDIOLOGY | Facility: CLINIC | Age: 64
End: 2024-03-13
Payer: MEDICARE

## 2024-03-13 ENCOUNTER — APPOINTMENT (OUTPATIENT)
Dept: CARDIOLOGY | Facility: CLINIC | Age: 64
End: 2024-03-13
Payer: MEDICARE

## 2024-03-13 VITALS
BODY MASS INDEX: 33 KG/M2 | DIASTOLIC BLOOD PRESSURE: 82 MMHG | HEART RATE: 72 BPM | OXYGEN SATURATION: 96 % | SYSTOLIC BLOOD PRESSURE: 122 MMHG | WEIGHT: 230 LBS

## 2024-03-13 DIAGNOSIS — I71.21 ANEURYSM OF ASCENDING AORTA WITHOUT RUPTURE (CMS-HCC): Primary | Chronic | ICD-10-CM

## 2024-03-13 DIAGNOSIS — I10 BENIGN HYPERTENSION: Chronic | ICD-10-CM

## 2024-03-13 DIAGNOSIS — I44.2 COMPLETE HEART BLOCK (MULTI): Chronic | ICD-10-CM

## 2024-03-13 DIAGNOSIS — I44.7 LEFT BUNDLE BRANCH BLOCK (LBBB): Chronic | ICD-10-CM

## 2024-03-13 PROCEDURE — 3074F SYST BP LT 130 MM HG: CPT | Performed by: PHYSICIAN ASSISTANT

## 2024-03-13 PROCEDURE — 99214 OFFICE O/P EST MOD 30 MIN: CPT | Performed by: PHYSICIAN ASSISTANT

## 2024-03-13 PROCEDURE — 1159F MED LIST DOCD IN RCRD: CPT | Performed by: PHYSICIAN ASSISTANT

## 2024-03-13 PROCEDURE — 1036F TOBACCO NON-USER: CPT | Performed by: PHYSICIAN ASSISTANT

## 2024-03-13 PROCEDURE — 1160F RVW MEDS BY RX/DR IN RCRD: CPT | Performed by: PHYSICIAN ASSISTANT

## 2024-03-13 PROCEDURE — 3079F DIAST BP 80-89 MM HG: CPT | Performed by: PHYSICIAN ASSISTANT

## 2024-03-13 NOTE — PROGRESS NOTES
Chief Complaint:   Aortic dilatation, complete heart block     History Of Present Illness:    Christopher Ralph is a 63 y.o. male presenting after recently completing a 2D echo displaying normal LV systolic function with moderately dilated LA, persistent mild dilatation of the thoracic aorta 4.3cm at largest diameter.  Patient denies chest pain, chest pressure, palpitations, dyspnea on exertion, shortness of breath at rest, diaphoresis, nausea/vomiting, back pain, headache, lightheadedness, dizziness, syncope or presyncopal episodes, active bleeding signs or symptoms, excessive weight gain, muscle or joint pain, claudication.       Last Recorded Vitals:  Vitals:    03/13/24 1418   BP: 122/82   BP Location: Right arm   Pulse: 72   SpO2: 96%   Weight: 104 kg (230 lb)       Past Medical History:  He has a past medical history of Aortic stenosis (12/21/2023), Bilateral leg edema (01/03/2024), Complete heart block (CMS/HCC) (12/21/2023), Left bundle branch block (LBBB) (12/21/2023), Overweight, Pacemaker (12/21/2023), Personal history of other diseases of the digestive system, Personal history of other specified conditions, Pulmonary embolism without acute cor pulmonale (CMS/HCC) (12/21/2023), Thoracic aortic aneurysm (CMS/HCC) (12/21/2023), and Thoracic aortic aneurysm, without rupture, unspecified (CMS/HCC) (08/26/2021).    Past Surgical History:  He has a past surgical history that includes Appendectomy (10/02/2017) and Other surgical history (08/12/2021).      Social History:  He reports that he quit smoking about 3 years ago. His smoking use included cigarettes. He has a 7.50 pack-year smoking history. He has been exposed to tobacco smoke. He has never used smokeless tobacco. No history on file for alcohol use and drug use.    Family History:  No family history on file.     Allergies:  Patient has no known allergies.    Outpatient Medications:  Current Outpatient Medications   Medication Instructions    amLODIPine  (NORVASC) 10 mg, oral, Daily    aspirin 81 mg, oral, Once    hydroCHLOROthiazide (MICROZIDE) 12.5 mg, oral, Daily    losartan (COZAAR) 100 mg, oral, Daily    pantoprazole (PROTONIX) 40 mg, oral, Daily       Physical Exam:  Constitutional: awake and alert, oriented ×3, no apparent distress  Skin: warm, dry, good turgor no obvious lesions  Eyes: pupils equal, round, reactive to light, conjunctiva pink and noninjected, no discharge  HENT: normocephalic and atraumatic, mucous membranes moist, trachea midline with no masses/goiter  Cardiovascular: S1/S2 regular, no murmur no rubs/gallops, no carotid bruits, no JVD  Pulmonary: symmetrical chest expansion, lungs are clear to auscultation bilaterally, no wheezes/rales/rhonchi, normal effort  Abdomen: nontender, nondistended, active bowel sounds, no ascites  Extremities: no cyanosis, clubbing, no LE edema no lesions; palpable pedal pulses  Neurologic: cranial nerves II - XII grossly intact, stable gait, no tremor       Last Labs:  CBC -  Lab Results   Component Value Date    WBC 6.5 02/21/2024    HGB 15.4 02/21/2024    HCT 45.2 02/21/2024    MCV 94 02/21/2024     02/21/2024       CMP -  Lab Results   Component Value Date    CALCIUM 9.6 02/21/2024    PHOS 3.3 01/26/2019    PROT 6.9 02/21/2024    ALBUMIN 4.2 02/21/2024    AST 19 02/21/2024    ALT 19 02/21/2024    ALKPHOS 105 02/21/2024    BILITOT 0.9 02/21/2024       LIPID PANEL -   Lab Results   Component Value Date    CHOL 184 02/21/2024    TRIG 65 02/21/2024    HDL 91.8 02/21/2024    CHHDL 2.0 02/21/2024    LDLF 68 08/10/2023    VLDL 13 02/21/2024    NHDL 92 02/21/2024       RENAL FUNCTION PANEL -   Lab Results   Component Value Date    GLUCOSE 90 02/21/2024     02/21/2024    K 4.8 02/21/2024     02/21/2024    CO2 24 02/21/2024    ANIONGAP 17 02/21/2024    BUN 20 02/21/2024    CREATININE 1.18 02/21/2024    GFRMALE 83 08/10/2023    CALCIUM 9.6 02/21/2024    PHOS 3.3 01/26/2019    ALBUMIN 4.2 02/21/2024         Lab Results   Component Value Date     (H) 01/26/2019       Last Cardiology Tests:  ECG:  ECG 12 Lead 01/03/2024      Echo:  Transthoracic Echo (TTE) Complete 01/16/2024  1. Left ventricular systolic function is normal with a 50-55% estimated ejection fraction.   2. Abnormal septal motion consistent with RV pacemaker.   3. Spectral Doppler shows an impaired relaxation pattern of left ventricular diastolic filling.   4. The left atrium is moderately dilated.   5. Mild mitral valve regurgitation.   6. Mildly elevated RVSP.   7. The thoracic aorta is mildly dilated measuring 4.3cm.    Ejection Fractions:  EF   Date/Time Value Ref Range Status   01/16/2024 02:00 PM 61         Cath:  No results found for this or any previous visit from the past 1095 days.      Stress Test:  No results found for this or any previous visit from the past 1095 days.      Cardiac Imaging:  No results found for this or any previous visit from the past 1095 days.      Assessment/Plan   Problem List Items Addressed This Visit             ICD-10-CM       Cardiac and Vasculature    Benign hypertension (Chronic) I10    Complete heart block (CMS/HCC) (Chronic) I44.2    Left bundle branch block (LBBB) (Chronic) I44.7    Thoracic aortic aneurysm (CMS/HCC) - Primary (Chronic) I71.20     -As discussed in HPI    -Continue PPM interrogation as scheduled with device clinic    -Mildly dilated ascending aorta, continue surveillance as per Dr. Tripathi    -F/U with Dr. Tripathi and Dr. Ramicone as scheduled    Rosendo Trevizo PA-C

## 2024-05-06 ENCOUNTER — APPOINTMENT (OUTPATIENT)
Dept: CARDIOLOGY | Facility: CLINIC | Age: 64
End: 2024-05-06
Payer: MEDICARE

## 2024-05-08 ENCOUNTER — HOSPITAL ENCOUNTER (OUTPATIENT)
Dept: CARDIOLOGY | Facility: CLINIC | Age: 64
Discharge: HOME | End: 2024-05-08
Payer: MEDICARE

## 2024-05-08 DIAGNOSIS — Z95.0 PRESENCE OF CARDIAC PACEMAKER: ICD-10-CM

## 2024-05-08 PROCEDURE — 93294 REM INTERROG EVL PM/LDLS PM: CPT | Performed by: INTERNAL MEDICINE

## 2024-05-08 PROCEDURE — 93296 REM INTERROG EVL PM/IDS: CPT

## 2024-09-11 ENCOUNTER — EVALUATION (OUTPATIENT)
Dept: PHYSICAL THERAPY | Facility: CLINIC | Age: 64
End: 2024-09-11
Payer: MEDICARE

## 2024-09-11 DIAGNOSIS — M54.50 LOW BACK PAIN, UNSPECIFIED: ICD-10-CM

## 2024-09-11 DIAGNOSIS — M51.36 LUMBAR DEGENERATIVE DISC DISEASE: Primary | ICD-10-CM

## 2024-09-11 PROCEDURE — 97110 THERAPEUTIC EXERCISES: CPT | Mod: GP | Performed by: PHYSICAL THERAPIST

## 2024-09-11 PROCEDURE — 97161 PT EVAL LOW COMPLEX 20 MIN: CPT | Mod: GP | Performed by: PHYSICAL THERAPIST

## 2024-09-11 ASSESSMENT — ENCOUNTER SYMPTOMS
LOSS OF SENSATION IN FEET: 0
OCCASIONAL FEELINGS OF UNSTEADINESS: 0
DEPRESSION: 0

## 2024-09-11 ASSESSMENT — PATIENT HEALTH QUESTIONNAIRE - PHQ9
1. LITTLE INTEREST OR PLEASURE IN DOING THINGS: NOT AT ALL
2. FEELING DOWN, DEPRESSED OR HOPELESS: NOT AT ALL
SUM OF ALL RESPONSES TO PHQ9 QUESTIONS 1 AND 2: 0

## 2024-09-11 ASSESSMENT — COLUMBIA-SUICIDE SEVERITY RATING SCALE - C-SSRS
1. IN THE PAST MONTH, HAVE YOU WISHED YOU WERE DEAD OR WISHED YOU COULD GO TO SLEEP AND NOT WAKE UP?: NO
6. HAVE YOU EVER DONE ANYTHING, STARTED TO DO ANYTHING, OR PREPARED TO DO ANYTHING TO END YOUR LIFE?: NO
2. HAVE YOU ACTUALLY HAD ANY THOUGHTS OF KILLING YOURSELF?: NO

## 2024-09-11 NOTE — LETTER
September 11, 2024    [place]  [address] [facility]  [facility] [address]    Patient: Christopher Ralph   YOB: 1960   Date of Visit: 9/11/2024       Dear Elvin Ho MD  [address] [place] [address]    The attached plan of care is being sent to you because your patient’s medical reimbursement requires that you certify the plan of care. Your signature is required to allow uninterrupted insurance coverage.      You may indicate your approval by signing below and faxing this form back to us at Dept Fax: [phone].    Please call Dept: [phone] with any questions or concerns.    Thank you for this referral,        Qian Gomez, PT  [facility] [address] [facility]  [address] [facility] [address]    Payer: Payor: MEDICARE / Plan: MEDICARE PART A AND B / Product Type: *No Product type* /                                                                         Date:     Dear Qian Gomez PT,     Re: Mr. Christopher Ralph, MRN:[id number]    I certify that I have reviewed the attached plan of care and it is medically necessary for Mr. Christopher Ralph (1960) who is under my care.          ______________________________________                    _________________  Provider name and credentials                                           Date and time                                                                                           Plan of Care 9/11/24   Effective from: 9/11/2024  Effective to: 12/9/2024    Plan ID: [id number]            Participants as of Finalize on 9/11/2024    Name Type Comments Contact Info    Elvin Ho MD Referring Provider  [phone]    Qian Gomez PT Physical Therapist  [phone]       Last Plan Note     Author: Qian Gomez PT Status: Incomplete Last edited: 9/11/2024 11:45 AM       Patient Name Christopher Ralph  MRN: [id number]  Today's Date: 9/11/2024  Time Calculation  Start  Time: 1145  Stop Time: 1230  Time Calculation (min): 45 min    Insurance:   Visit #: 1(4 visits at [facility] this year) KX at visit #16  Insurance Reviewed  (per information provided by  pre-cert team)   [facility] Certification date range:  9-11-24/12-9-24    Therapy Diagnoses:   Problem List Items Addressed This Visit             ICD-10-CM    Lumbar degenerative disc disease - Primary M51.36    Relevant Orders    Follow Up In Physical Therapy    Low back pain, unspecified M54.50    Relevant Orders    Follow Up In Physical Therapy     General:  Reason for visit: R Lumbar radiculopathy   Referred by: Elvin Cox MD appt:  in a few months, none scheduled yet  Preferred Name:  Terrance  Script:  PT  Onset Date:  11-1-23  Most recent assessment/re-assessment: 9-11-24  Email/phone #:  [email or web address]   Access Code: [email or web address]  Issued large 9 mm heel lift for L shoe    Assessment:    Evolving with changing characteristics  Level of complexity:  low    Patient presents with flare up of OA in the LS, signs and symptoms are consistent with diagnosis and patient is an excellent candidate for Physical Therapy and needs work on/Skilled PT for ROM, flexibility, dynamic stabilization, strength, posture, body mechanics and gait/stairs for improved overall function.       Problems:    Pain:  _x__  Posture/Body mechanics deficits:  __x_  Decreased knowledge HEP:  __x_  Decreased knowledge of Precautions:  x___  Activity Limitations:   __x_  ADL's/IADL's/Self-care skills:  _x__  ROM/Joint Mobility:  _x__  Strength:  _x__  Decreased functional level:   _x__  Flexibility:  _x__  Tenderness/decreased soft tissue mobility:  ___  Gait/Stairs:  __x_  Fall Risk:  ___  Balance:  ___  Edema:  ___  Participation restrictions:  ___  Sensory:  ___  Transfers:  ___  Decreased knowledge of brace:   ___  Other:  ___    X Indicates included in problem list    Goals:    STG:    -Increased postural awareness  -Compliant with HEP.   LTG:  by  discharge  -Increased postural awareness and posture WFL.  - pain to:  2/10 at worst and patient I with self management of symptoms.   -Decreased pain and increased function with ADL's and IADL's.  Improve Oswestry  to: 15%  limitation of function.  -Normal gait on level and uneven surfaces community level distances for improved function in the community.  -Increase trunk AROM to WFL for improved function with daily activities.    -Increase trunk strength and stability and R/L LE strength to WFL for improved function with chores/work duties.  -Increase B LE flexibility to WFL.    -Decrease R LE radicular symptoms 50-75% per patient report.    -I and compliant with HEP and proper: LE/lower back care/I with proper Y work out.     Rehab potential to achieve the above goals is good.    Patient is aware of diagnosis and prognosis and agrees with established plan of care and goals.    Plan:   Skilled PT 2  x/week for 12 weeks( Until goals achieved, maximum rehab potential has been achieved, or patient has plateaued)  for:    Aquatics  __x___prn  CP   __x__  Dry needling  ____  Education  __x__  Electrical Stimulation  ____ NO /PATIENT HAS A PACEMAKER  Gait training  __x__  HEP  __x__  Manual  __x__  Mechanical Traction  __x__prn  NMR  __x__  Self-care/home management  __x__  Therapeutic Exercise   __x__  Therapeutic Activities  __x__  US  __x__  Work Conditioning  ____  Re-assessment  _x___  Other  ____    X Indicates included in treatment plan    PT for Nu-step for functional mobility and soft tissue warm up for more efficient stretching, work on ROM, flexibility, dynamic stabilization, strength, posture, body mechanics and gait/stairs for improved overall function.   Manual and modalities prn, but no E-stim due to pacemaker.     Subjective:  HPI:  Patient reports insidious onset of LS and R buttock pain in .  Patient had R hip x-ray in  that was - and recent LS x-rays showed :  Standing lateral LS  spine shows a grade 1 anterolisthesis L4 on 5.  This   has increased from imaging done 8 or 9 months ago.  No other acute   abnormalities noted.  Mild segmental spurring again seen.     AP pelvis shows minimal peripheral spurring both hip joints   well-maintained joint spaces.  Greater trochanter is skin in pelvis in   general unremarkable in appearance soft tissues right buttock region   unremarkable.     Patient is referred to outpatient PT.  Patient has 4 PT visits at [facility] earlier this year and we reviewed HEP for mobility and core stability and he was doing prone press ups, he was advised to stop the prone press ups, but to continue with other exercises.  Patient reports that Dr. Ho may consider cortisone injection prn.      Pain:  5/10 R LS and into R distal buttock  Type of pain:  aching  What increases pain: standing and walking   What decreases pain:  sitting    Medical Hx/  Fall Risk:  no  Steadi:  0 yes  Precautions: HTN, L hip ORIF , Pacemaker, no e-stim, see meds in chart/no trunk extension due to spondylolisthesis.      Human Trafficking risk:  No    Patient goal:  Decreased pain and increased function.   Patient is aware of diagnosis and prognosis.    Living Environment:  apartment with elevator and laundry on another floor  Social Support:  lives alone and with cat     Prior Function:   working out Y with machines and doing some jogging on the TM    Function:    A and O x 3  Sleep: pain keeps him up at times, side lying,  instructed in proper postures.  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     Posture:  minimally for flexed posture, severely increased thoracic  "kyphosis and L pelvic landmarks low, L severe subtalar joint pronation/recommended custom or at least OTC arch supports and issued large 9 mm heel lift for the L shoe with some improvements noted in pelvic symmetry/L LE ER at hip/L genu valgus.      Gait/Stairs: moderately decreased trunk rotation and hip extension. L LE ER at hip throughout cycle, and mod L glut med lurch and reciprocal pattern on stairs with light rail support.     Palpation:  - pop  ROM:  Trunk Flexion:  1\" 3rd to floor  Extension: 0/16  RSB:  1\" 3rd to fibular head  LSB: 1\" 3rd to fibular head  RR: 25%   LR: 25%    MMT:  Abd: 4/5  Bridge: 50%  B LE myotomes:  WNL and symmetrical except L hip flexors:  4-/5    Flexibility:  Hamstrings:  90/90:  R:   -25  L: -30  Heelcords: 0 B  Hip flexors: mod B  Gluts: mod B  Piriformis:  mod B    Neuro:  (at re-assessment)    Treatment:    Evaluation:  30 minutes    **= HEP  NV= Next visit  np= not performed  nb= non-billable  G= group HEP= discharged to HEP    Therapeutic Exercise: 8  Nu-step(subjective taken/education):    NV    Standing hams and hip flexor/calf stretches: NV    Seated flexion stretch:  10/10\"**    LTR:  x 15/5\"**prior HEP  B SKTC:  10/10\"**prior HEP  B supine piriformis stretch:  3/30\"**    NMR:  2  T-band 3-way pull downs:  NV  T-band obliques: NV  T-band B pull for/back:  NV    Pelvic tilt:  10/5\"**(prior HEP)  Bridges:  x 15  PT hooklying with add set:  NV  PT hooklying with t-band abd:  NV     Self Care Home Management:    minutes  Education:  poc, anatomy, physiology, posture, body mechanics, safety awareness, HEP.  Preferred learning:  pictures, demonstration.  Demonstrated good understanding.      Access Code: [email or web address]  URL: [email or web address]  Date: 09/11/2024  Prepared by: Qian Gomez    Exercises  - Supine Lower Trunk Rotation  - 1 x daily - 7 x weekly - 2-3 sets - 10 reps  - Hooklying Single Knee to Chest Stretch  - 1 x daily - 7 x weekly - 1 sets - " 10 reps - 10 second hold  - Supine Piriformis Stretch with Foot on Ground  - 1 x daily - 7 x weekly - 1 sets - 3 reps - 30 second hold  - Supine Posterior Pelvic Tilt  - 1 x daily - 7 x weekly - 2-3 sets - 10 reps - 5 second hold  - Supine Bridge  - 1 x daily - 7 x weekly - 3 sets - 10 reps - 2-3 second hold  - Seated Flexion Stretch  - 1 x daily - 7 x weekly - 1 sets - 10 reps - 10 second hold hold                                         Current Participants as of 9/11/2024    Name Type Comments Contact Info    Elvin Ho MD Referring Provider  [phone]    Signature pending    Qian Gomez, PT Physical Therapist  [phone]    Electronically signed by Qian Gomez PT at 9/11/2024 1236 EDT

## 2024-09-11 NOTE — LETTER
September 17, 2024    Elvin Ho MD  [address] [facility] [address]    Patient: Christopher Ralph   YOB: 1960   Date of Visit: 9/11/2024       Dear Elvin Ho MD  [address] [place] [address]    The attached plan of care is being sent to you because your patient’s medical reimbursement requires that you certify the plan of care. Your signature is required to allow uninterrupted insurance coverage.      You may indicate your approval by signing below and faxing this form back to us at Dept Fax: [phone].    Please call Dept: [phone] with any questions or concerns.    Thank you for this referral,        Qian Gomez, PT  [facility] [address] [facility]  [address] [facility] [address]    Payer: Payor: MEDICARE / Plan: MEDICARE PART A AND B / Product Type: *No Product type* /                                                                         Date:     Dear Qian Gomez, PT,     Re: Mr. Christopher Ralph, MRN:[id number]    I certify that I have reviewed the attached plan of care and it is medically necessary for Mr. Christopher Ralph (1960) who is under my care.          ______________________________________                    _________________  Provider name and credentials                                           Date and time                                                                                           Plan of Care 9/11/24   Effective from: 9/11/2024  Effective to: 12/9/2024    Plan ID: [id number]                Participants as of Finalize on 9/11/2024      Name Type Comments Contact Info    Elvin Ho MD Referring Provider  [phone]    Qian Gomez PT Physical Therapist  [phone]           Last Plan Note       Author: Qian Gomez PT Status: Incomplete Last edited: 9/11/2024 11:45 AM         Patient Name Christopher Ralph  MRN: [id number]  Today's Date: 9/11/2024  Time Calculation  Start Time: 1145  Stop Time:  1230  Time Calculation (min): 45 min    Insurance:   Visit #: 1(4 visits at [facility] this year) KX at visit #16  Insurance Reviewed  (per information provided by  pre-cert team)   [facility] Certification date range:  9-11-24/12-9-24    Therapy Diagnoses:   Problem List Items Addressed This Visit             ICD-10-CM    Lumbar degenerative disc disease - Primary M51.36    Relevant Orders    Follow Up In Physical Therapy    Low back pain, unspecified M54.50    Relevant Orders    Follow Up In Physical Therapy     General:  Reason for visit: R Lumbar radiculopathy   Referred by: Elvin Cox MD appt:  in a few months, none scheduled yet  Preferred Name:  Terrance  Script:  PT  Onset Date:  11-1-23  Most recent assessment/re-assessment: 9-11-24  Email/phone #:  [email or web address]   Access Code: [email or web address]  Issued large 9 mm heel lift for L shoe    Assessment:    Evolving with changing characteristics  Level of complexity:  low    Patient presents with flare up of OA in the LS, signs and symptoms are consistent with diagnosis and patient is an excellent candidate for Physical Therapy and needs work on/Skilled PT for ROM, flexibility, dynamic stabilization, strength, posture, body mechanics and gait/stairs for improved overall function.       Problems:    Pain:  _x__  Posture/Body mechanics deficits:  __x_  Decreased knowledge HEP:  __x_  Decreased knowledge of Precautions:  x___  Activity Limitations:   __x_  ADL's/IADL's/Self-care skills:  _x__  ROM/Joint Mobility:  _x__  Strength:  _x__  Decreased functional level:   _x__  Flexibility:  _x__  Tenderness/decreased soft tissue mobility:  ___  Gait/Stairs:  __x_  Fall Risk:  ___  Balance:  ___  Edema:  ___  Participation restrictions:  ___  Sensory:  ___  Transfers:  ___  Decreased knowledge of brace:   ___  Other:  ___    X Indicates included in problem list    Goals:    STG:    -Increased postural awareness  -Compliant with HEP.   LTG:  by discharge  -Increased postural  awareness and posture WFL.  - pain to:  2/10 at worst and patient I with self management of symptoms.   -Decreased pain and increased function with ADL's and IADL's.  Improve Oswestry  to: 15%  limitation of function.  -Normal gait on level and uneven surfaces community level distances for improved function in the community.  -Increase trunk AROM to WFL for improved function with daily activities.    -Increase trunk strength and stability and R/L LE strength to WFL for improved function with chores/work duties.  -Increase B LE flexibility to WFL.    -Decrease R LE radicular symptoms 50-75% per patient report.    -I and compliant with HEP and proper: LE/lower back care/I with proper Y work out.     Rehab potential to achieve the above goals is good.    Patient is aware of diagnosis and prognosis and agrees with established plan of care and goals.    Plan:   Skilled PT 2  x/week for 12 weeks( Until goals achieved, maximum rehab potential has been achieved, or patient has plateaued)  for:    Aquatics  __x___prn  CP   __x__  Dry needling  ____  Education  __x__  Electrical Stimulation  ____ NO /PATIENT HAS A PACEMAKER  Gait training  __x__  HEP  __x__  Manual  __x__  Mechanical Traction  __x__prn  NMR  __x__  Self-care/home management  __x__  Therapeutic Exercise   __x__  Therapeutic Activities  __x__  US  __x__  Work Conditioning  ____  Re-assessment  _x___  Other  ____    X Indicates included in treatment plan    PT for Nu-step for functional mobility and soft tissue warm up for more efficient stretching, work on ROM, flexibility, dynamic stabilization, strength, posture, body mechanics and gait/stairs for improved overall function.   Manual and modalities prn, but no E-stim due to pacemaker.     Subjective:  HPI:  Patient reports insidious onset of LS and R buttock pain in .  Patient had R hip x-ray in  that was - and recent LS x-rays showed :  Standing lateral LS spine shows a grade 1  anterolisthesis L4 on 5.  This   has increased from imaging done 8 or 9 months ago.  No other acute   abnormalities noted.  Mild segmental spurring again seen.     AP pelvis shows minimal peripheral spurring both hip joints   well-maintained joint spaces.  Greater trochanter is skin in pelvis in   general unremarkable in appearance soft tissues right buttock region   unremarkable.     Patient is referred to outpatient PT.  Patient has 4 PT visits at [facility] earlier this year and we reviewed HEP for mobility and core stability and he was doing prone press ups, he was advised to stop the prone press ups, but to continue with other exercises.  Patient reports that Dr. Ho may consider cortisone injection prn.      Pain:  5/10 R LS and into R distal buttock  Type of pain:  aching  What increases pain: standing and walking   What decreases pain:  sitting    Medical Hx/  Fall Risk:  no  Steadi:  0 yes  Precautions: HTN, L hip ORIF , Pacemaker, no e-stim, see meds in chart/no trunk extension due to spondylolisthesis.      Human Trafficking risk:  No    Patient goal:  Decreased pain and increased function.   Patient is aware of diagnosis and prognosis.    Living Environment:  apartment with elevator and laundry on another floor  Social Support:  lives alone and with cat     Prior Function:   working out Y with machines and doing some jogging on the TM    Function:    A and O x 3  Sleep: pain keeps him up at times, side lying,  instructed in proper postures.  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     Posture:  minimally for flexed posture, severely increased thoracic kyphosis and L pelvic  "landmarks low, L severe subtalar joint pronation/recommended custom or at least OTC arch supports and issued large 9 mm heel lift for the L shoe with some improvements noted in pelvic symmetry/L LE ER at hip/L genu valgus.      Gait/Stairs: moderately decreased trunk rotation and hip extension. L LE ER at hip throughout cycle, and mod L glut med lurch and reciprocal pattern on stairs with light rail support.     Palpation:  - pop  ROM:  Trunk Flexion:  1\" 3rd to floor  Extension: 0/16  RSB:  1\" 3rd to fibular head  LSB: 1\" 3rd to fibular head  RR: 25%   LR: 25%    MMT:  Abd: 4/5  Bridge: 50%  B LE myotomes:  WNL and symmetrical except L hip flexors:  4-/5    Flexibility:  Hamstrings:  90/90:  R:   -25  L: -30  Heelcords: 0 B  Hip flexors: mod B  Gluts: mod B  Piriformis:  mod B    Neuro:  (at re-assessment)    Treatment:    Evaluation:  30 minutes    **= HEP  NV= Next visit  np= not performed  nb= non-billable  G= group HEP= discharged to HEP    Therapeutic Exercise: 8  Nu-step(subjective taken/education):    NV    Standing hams and hip flexor/calf stretches: NV    Seated flexion stretch:  10/10\"**    LTR:  x 15/5\"**prior HEP  B SKTC:  10/10\"**prior HEP  B supine piriformis stretch:  3/30\"**    NMR:  2  T-band 3-way pull downs:  NV  T-band obliques: NV  T-band B pull for/back:  NV    Pelvic tilt:  10/5\"**(prior HEP)  Bridges:  x 15  PT hooklying with add set:  NV  PT hooklying with t-band abd:  NV     Self Care Home Management:    minutes  Education:  poc, anatomy, physiology, posture, body mechanics, safety awareness, HEP.  Preferred learning:  pictures, demonstration.  Demonstrated good understanding.      Access Code: [email or web address]  URL: [email or web address]  Date: 09/11/2024  Prepared by: Qian Gomez    Exercises  - Supine Lower Trunk Rotation  - 1 x daily - 7 x weekly - 2-3 sets - 10 reps  - Hooklying Single Knee to Chest Stretch  - 1 x daily - 7 x weekly - 1 sets - 10 reps - 10 second " hold  - Supine Piriformis Stretch with Foot on Ground  - 1 x daily - 7 x weekly - 1 sets - 3 reps - 30 second hold  - Supine Posterior Pelvic Tilt  - 1 x daily - 7 x weekly - 2-3 sets - 10 reps - 5 second hold  - Supine Bridge  - 1 x daily - 7 x weekly - 3 sets - 10 reps - 2-3 second hold  - Seated Flexion Stretch  - 1 x daily - 7 x weekly - 1 sets - 10 reps - 10 second hold hold                                         Current Participants as of 9/17/2024      Name Type Comments Contact Info    Elvin Ho MD Referring Provider  [phone]    Signature pending    Qian Gomez, PT Physical Therapist  [phone]    Electronically signed by Qian Gomez, PT at 9/11/2024 1236 EDT

## 2024-09-11 NOTE — PROGRESS NOTES
Patient Name Christopher Ralph  MRN: [id number]  Today's Date: 9/11/2024  Time Calculation  Start Time: 1145  Stop Time: 1230  Time Calculation (min): 45 min    Insurance:   Visit #: 1(4 visits at [facility] this year) KX at visit #16  Insurance Reviewed  (per information provided by  pre-cert team)   [facility]  MCR Certification date range:  9-11-24/12-9-24    Therapy Diagnoses:   Problem List Items Addressed This Visit             ICD-10-CM    Lumbar degenerative disc disease - Primary M51.36    Relevant Orders    Follow Up In Physical Therapy    Low back pain, unspecified M54.50    Relevant Orders    Follow Up In Physical Therapy     General:  Reason for visit: R Lumbar radiculopathy   Referred by: Elvin Cox MD appt:  in a few months, none scheduled yet  Preferred Name:  Terrance  Script:  PT  Onset Date:  11-1-23  Most recent assessment/re-assessment: 9-11-24  Email/phone #:  [email or web address]   Access Code: [email or web address]  Issued large 9 mm heel lift for L shoe    Assessment:    Evolving with changing characteristics  Level of complexity:  low    Patient presents with flare up of OA in the LS, signs and symptoms are consistent with diagnosis and patient is an excellent candidate for Physical Therapy and needs work on/Skilled PT for ROM, flexibility, dynamic stabilization, strength, posture, body mechanics and gait/stairs for improved overall function.       Problems:    Pain:  _x__  Posture/Body mechanics deficits:  __x_  Decreased knowledge HEP:  __x_  Decreased knowledge of Precautions:  x___  Activity Limitations:   __x_  ADL's/IADL's/Self-care skills:  _x__  ROM/Joint Mobility:  _x__  Strength:  _x__  Decreased functional level:   _x__  Flexibility:  _x__  Tenderness/decreased soft tissue mobility:  ___  Gait/Stairs:  __x_  Fall Risk:  ___  Balance:  ___  Edema:  ___  Participation restrictions:  ___  Sensory:  ___  Transfers:  ___  Decreased knowledge of brace:   ___  Other:  ___    X Indicates included in problem  list    Goals:    STG:    -Increased postural awareness  -Compliant with HEP.   LTG:  by discharge  -Increased postural awareness and posture WFL.  - pain to:  2/10 at worst and patient I with self management of symptoms.   -Decreased pain and increased function with ADL's and IADL's.  Improve Oswestry  to: 15%  limitation of function.  -Normal gait on level and uneven surfaces community level distances for improved function in the community.  -Increase trunk AROM to WFL for improved function with daily activities.    -Increase trunk strength and stability and R/L LE strength to WFL for improved function with chores/work duties.  -Increase B LE flexibility to WFL.    -Decrease R LE radicular symptoms 50-75% per patient report.    -I and compliant with HEP and proper: LE/lower back care/I with proper Y work out.     Rehab potential to achieve the above goals is good.    Patient is aware of diagnosis and prognosis and agrees with established plan of care and goals.    Plan:   Skilled PT 2  x/week for 12 weeks( Until goals achieved, maximum rehab potential has been achieved, or patient has plateaued)  for:    Aquatics  __x___prn  CP   __x__  Dry needling  ____  Education  __x__  Electrical Stimulation  ____ NO /PATIENT HAS A PACEMAKER  Gait training  __x__  HEP  __x__  Manual  __x__  Mechanical Traction  __x__prn  NMR  __x__  Self-care/home management  __x__  Therapeutic Exercise   __x__  Therapeutic Activities  __x__  US  __x__  Work Conditioning  ____  Re-assessment  _x___  Other  ____    X Indicates included in treatment plan    PT for Nu-step for functional mobility and soft tissue warm up for more efficient stretching, work on ROM, flexibility, dynamic stabilization, strength, posture, body mechanics and gait/stairs for improved overall function.   Manual and modalities prn, but no E-stim due to pacemaker.     Subjective:  HPI:  Patient reports insidious onset of LS and R buttock pain in .  Patient  had R hip x-ray in  that was - and recent LS x-rays showed :  Standing lateral LS spine shows a grade 1 anterolisthesis L4 on 5.  This   has increased from imaging done 8 or 9 months ago.  No other acute   abnormalities noted.  Mild segmental spurring again seen.     AP pelvis shows minimal peripheral spurring both hip joints   well-maintained joint spaces.  Greater trochanter is skin in pelvis in   general unremarkable in appearance soft tissues right buttock region   unremarkable.     Patient is referred to outpatient PT.  Patient has 4 PT visits at [facility] earlier this year and we reviewed HEP for mobility and core stability and he was doing prone press ups, he was advised to stop the prone press ups, but to continue with other exercises.  Patient reports that Dr. Ho may consider cortisone injection prn.      Pain:  5/10 R LS and into R distal buttock  Type of pain:  aching  What increases pain: standing and walking   What decreases pain:  sitting    Medical Hx/  Fall Risk:  no  Steadi:  0 yes  Precautions: HTN, L hip ORIF , Pacemaker, no e-stim, see meds in chart/no trunk extension due to spondylolisthesis.      Human Trafficking risk:  No    Patient goal:  Decreased pain and increased function.   Patient is aware of diagnosis and prognosis.    Living Environment:  apartment with elevator and laundry on another floor  Social Support:  lives alone and with cat     Prior Function:   working out Y with machines and doing some jogging on the TM    Function:    A and O x 3  Sleep: pain keeps him up at times, side lying,  instructed in proper postures.  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     Posture:  minimally for flexed posture, severely increased thoracic kyphosis and L pelvic landmarks low, L severe subtalar joint pronation/recommended custom or at least OTC arch supports and issued large 9 mm heel lift for the L shoe with some improvements noted in pelvic symmetry/L LE ER at hip/L genu valgus.      Gait/Stairs: moderately decreased trunk rotation and hip extension. L LE ER at hip throughout cycle, and mod L glut med lurch and reciprocal pattern on stairs with light rail support.     Palpation:  - pop  ROM:  Trunk Flexion:  1\" 3rd to floor  Extension: 0/16  RSB:  1\" 3rd to fibular head  LSB: 1\" 3rd to fibular head  RR: 25%   LR: 25%    MMT:  Abd: 4/5  Bridge: 50%  B LE myotomes:  WNL and symmetrical except L hip flexors:  4-/5    Flexibility:  Hamstrings:  90/90:  R:   -25  L: -30  Heelcords: 0 B  Hip flexors: mod B  Gluts: mod B  Piriformis:  mod B    Neuro:  (at re-assessment)    Treatment:    Evaluation:  30 minutes    **= HEP  NV= Next visit  np= not performed  nb= non-billable  G= group HEP= discharged to HEP    Therapeutic Exercise: 8  Nu-step(subjective taken/education):    NV    Standing hams and hip flexor/calf stretches: NV    Seated flexion stretch:  10/10\"**    LTR:  x 15/5\"**prior HEP  B SKTC:  10/10\"**prior HEP  B supine piriformis stretch:  3/30\"**    NMR:  2  T-band 3-way pull downs:  NV  T-band obliques: NV  T-band B pull for/back:  NV    Pelvic tilt:  10/5\"**(prior HEP)  Bridges:  x 15  PT hooklying with add set:  NV  PT hooklying with t-band abd:  NV     Self Care Home Management:    minutes  Education:  poc, anatomy, physiology, posture, body mechanics, safety awareness, HEP.  Preferred learning:  pictures, demonstration.  Demonstrated good understanding.      Access Code: [email or web address]  URL: [email or web address]  Date: 09/11/2024  Prepared by: Qian Gomez    Exercises  - Supine Lower Trunk Rotation  - 1 x daily - 7 x weekly - 2-3 sets - 10 " reps  - Hooklying Single Knee to Chest Stretch  - 1 x daily - 7 x weekly - 1 sets - 10 reps - 10 second hold  - Supine Piriformis Stretch with Foot on Ground  - 1 x daily - 7 x weekly - 1 sets - 3 reps - 30 second hold  - Supine Posterior Pelvic Tilt  - 1 x daily - 7 x weekly - 2-3 sets - 10 reps - 5 second hold  - Supine Bridge  - 1 x daily - 7 x weekly - 3 sets - 10 reps - 2-3 second hold  - Seated Flexion Stretch  - 1 x daily - 7 x weekly - 1 sets - 10 reps - 10 second hold hold

## 2024-09-18 ENCOUNTER — TREATMENT (OUTPATIENT)
Dept: PHYSICAL THERAPY | Facility: CLINIC | Age: 64
End: 2024-09-18
Payer: MEDICARE

## 2024-09-18 DIAGNOSIS — M51.36 LUMBAR DEGENERATIVE DISC DISEASE: ICD-10-CM

## 2024-09-18 DIAGNOSIS — M54.50 LOW BACK PAIN, UNSPECIFIED: ICD-10-CM

## 2024-09-18 PROCEDURE — 97110 THERAPEUTIC EXERCISES: CPT | Mod: GP,CQ

## 2024-09-18 PROCEDURE — 97112 NEUROMUSCULAR REEDUCATION: CPT | Mod: GP,CQ

## 2024-09-18 NOTE — PROGRESS NOTES
"Physical Therapy  Physical Therapy Progress Note    Patient Name Christopher Ralph   MRN: [id number]  Today's Date: 09/18/24  Time Calculation  Start Time: 1130  Stop Time: 1208  Time Calculation (min): 38 min    Insurance:    Visit #: 2  (4 visits at [facility] this year) KX at visit #16  Insurance Reviewed  (per information provided by  pre-cert team)   [facility] Certification date range:  9-11-24/12-9-24    Therapy Diagnoses:   1. Low back pain, unspecified  Follow Up In Physical Therapy      2. Lumbar degenerative disc disease  Follow Up In Physical Therapy          General:  Reason for visit: R Lumbar radiculopathy   Referred by: Elvin Cox MD appt:  in a few months, none scheduled yet  Preferred Name:  Terrance  Script:  PT  Onset Date:  11-1-23  Most recent assessment/re-assessment: 9-11-24  Email/phone #:  [email or web address]   Access Code: [email or web address]  Issued large 9 mm heel lift for L shoe  Assessment:  Patient tolerated treatment well, did well with progression this date.  Initiated LE flexibility, progressed supine stabilization and updated HEP.  Patient needs continued work on/skilled PT for: ROM/strengthening/flexibility to address remaining functional, objective and subjective deficits to allow them to return to prior /optimal level of function with ADLs.  Patient is progressing with goals: compliant with HEP  Skilled care:  exercise progression    Plan:    Continue to progress per poc:   NV continue with standing core strengthening and add to HEP as able     Subjective:   Patient reports:  Some pain today. Did the HEP, wearing the heel lift without issues.    Have you fallen since last visit:  no    Precautions:    Medical Hx/  Fall Risk:  no  Steadi:  0 yes  Precautions: HTN, L hip ORIF 1968, Pacemaker, no e-stim, see meds in chart/no trunk extension due to spondylolisthesis.         Pain:  3/10  Location/Type of pain:  R LS/ R buttock  \"aching\"    HEP compliance/understanding:  yes    Objective:   Objective " "Measurements:      Flexibility:  tight hamstrings/hip flexors/piriformis    Treatment:   **= HEP  NV= Next visit  np= not performed  nb= non-billable  G= group HEP= discharged to HEP  Therapeutic Exercise:     22 minutes  Nu-step(subjective taken/education):    L3 8'      Standing hams and hip flexor/calf stretches: 30/3 **     Seated flexion stretch:  10/10\"**     LTR:  x 15/5\"**prior HEP with Green swiss ball  B SKTC:  10/10\"**prior HEP with green swiss ball   B supine piriformis stretch:  3/30\"**         Neuromuscular Re-education:    16 minutes  T-band 3-way pull downs:  NV  T-band obliques: NV  T-band B pull for/back:  GTB 10x      Pelvic tilt:  10/5\"**(prior HEP)  Bridges: with green swiss ball  x 15 **  PT hooklying with add set: 5 sec 10x **  PT hooklying with t-band abd:  NV       Education:  exercise progression/ educated in sitting and standing posture  HEP Progression:      Access Code: [email or web address]  URL: [email or web address]  Date: 09/18/2024  Prepared by: Rocio    Exercises  - Supine Lower Trunk Rotation  - 1 x daily - 7 x weekly - 2-3 sets - 10 reps  - Hooklying Single Knee to Chest Stretch  - 1 x daily - 7 x weekly - 1 sets - 10 reps - 10 second hold  - Supine Piriformis Stretch with Foot on Ground  - 1 x daily - 7 x weekly - 1 sets - 3 reps - 30 second hold  - Supine Posterior Pelvic Tilt  - 1 x daily - 7 x weekly - 2-3 sets - 10 reps - 5 second hold  - Supine Bridge  - 1 x daily - 7 x weekly - 3 sets - 10 reps - 2-3 second hold  - Seated Flexion Stretch  - 1 x daily - 7 x weekly - 1 sets - 10 reps - 10 second hold hold  - Standing Hamstring Stretch on Chair  - 1 x daily - 7 x weekly - 1 sets - 3 reps - 30 hold  - Hip Flexor Stretch with Chair  - 1 x daily - 7 x weekly - 1 sets - 3 reps - 30 hold  - Supine Hip Adduction Isometric with Ball  - 1 x daily - 7 x weekly - 2 sets - 10 reps - 5 hold     "

## 2024-09-27 ENCOUNTER — TREATMENT (OUTPATIENT)
Dept: PHYSICAL THERAPY | Facility: CLINIC | Age: 64
End: 2024-09-27
Payer: MEDICARE

## 2024-09-27 DIAGNOSIS — M51.36 LUMBAR DEGENERATIVE DISC DISEASE: ICD-10-CM

## 2024-09-27 DIAGNOSIS — M54.50 LOW BACK PAIN, UNSPECIFIED: ICD-10-CM

## 2024-09-27 PROCEDURE — 97110 THERAPEUTIC EXERCISES: CPT | Mod: GP,CQ

## 2024-09-27 PROCEDURE — 97112 NEUROMUSCULAR REEDUCATION: CPT | Mod: GP,CQ

## 2024-09-27 NOTE — PROGRESS NOTES
"  Physical Therapy  Physical Therapy Progress Note    Patient Name Christopher Ralph   MRN: [id number]  Today's Date: 9/27/2024  Time Calculation  Start Time: 0915  Stop Time: 0955  Time Calculation (min): 40 min    Insurance:    Visit #:  3    (4 visits at [facility] this year) KX at visit #16  Insurance Reviewed  (per information provided by  pre-cert team)   [facility] Certification date range:  9-11-24/12-9-24  Therapy Diagnoses:   1. Low back pain, unspecified  Follow Up In Physical Therapy      2. Lumbar degenerative disc disease  Follow Up In Physical Therapy          General:  Reason for visit: R Lumbar radiculopathy   Referred by: Elvin Cox MD appt:  in a few months, none scheduled yet  Preferred Name:  Terrance  Script:  PT  Onset Date:  11-1-23  Most recent assessment/re-assessment: 9-11-24  Email/phone #:  [email or web address]   Access Code: [email or web address]  Issued large 9 mm heel lift for L shoe  Assessment:  Patient tolerated treatment: well, Addressed correct mechanics with lifting for work.Progressed strength program with resistive bands with DLS.  Patient needs continued work on/ core strength and mobility skilled PT for: progression with exercise and  to address remaining functional, objective and subjective deficits to allow them to return to prior /optimal level of function with ADLs.  Patient is progressing with goals: yes  Skilled care:  instruction in correct mechanics with exercise and lifting     Plan:         Continue to progress per poc:   NV add   SL multip    Subjective:   Patient reports:   he had to do a lot of heavy lifting at work    Have you fallen since last visit:  no      Precautions:    Medical Hx/  Fall Risk:  no  Steadi:  0 yes  Precautions: HTN, L hip ORIF 1968, Pacemaker, no e-stim, see meds in chart/no trunk extension due to spondylolisthesis.          Pain:  4/10  Location/Type of pain:  R LS/ R buttock  \"aching\"     HEP compliance/understanding:  yes         Objective:   Objective " "Measurements:    Function:   lifting 40# bags at work. Instructed in keeping neutral spine with mechanics of lifting      Strength:Patient reported bridge exercise increased discomfort and advised to hold off. Progressed with with core stability witrh added resistive bands     Treatment:   **= HEP  NV= Next visit  np= not performed  nb= non-billable  G= group HEP= discharged to HEP      Therapeutic Exercise:     15 minutes  Nu-step(subjective taken/education):    L3 8'      Standing hams and hip flexor/calf stretches: 30/3 **   Seated flexion stretch:  ball roll out  10/10\"**   LTR:  x 15/5\"**prior HEP with Green swiss ball    B SKTC:  10/10\"**prior HEP with green swiss ball   B supine piriformis stretch:  3/30\"**  **Bridge causes increase discomfort **          Neuromuscular Re-education:    25 minutes  -tband 3-way pull downs:  black   15x **  T-band obliques: black 15x  **  T-band B pull for/back:  BTB 10x      Pelvic tilt:  10/5\"**(prior HEP)  PT hooklying with add set: 5 sec 10x    PT hooklying with t-band abd:  10sec 10x **   DLS SLR 10x **               Education:  ex progression with POC   HEP :  DLS 3 way pull down ,obliques, add sets,DLS SLR    Access Code: [email or web address]  URL: [email or web address]  Date: 09/18/2024  Prepared by: Rocio     Exercises  - Supine Lower Trunk Rotation  - 1 x daily - 7 x weekly - 2-3 sets - 10 reps  - Hooklying Single Knee to Chest Stretch  - 1 x daily - 7 x weekly - 1 sets - 10 reps - 10 second hold  - Supine Piriformis Stretch with Foot on Ground  - 1 x daily - 7 x weekly - 1 sets - 3 reps - 30 second hold  - Supine Posterior Pelvic Tilt  - 1 x daily - 7 x weekly - 2-3 sets - 10 reps - 5 second hold  - Supine Bridge  - 1 x daily - 7 x weekly - 3 sets - 10 reps - 2-3 second hold  - Seated Flexion Stretch  - 1 x daily - 7 x weekly - 1 sets - 10 reps - 10 second hold hold  - Standing Hamstring Stretch on Chair  - 1 x daily - 7 x weekly - 1 sets - 3 reps - 30 " hold  - Hip Flexor Stretch with Chair  - 1 x daily - 7 x weekly - 1 sets - 3 reps - 30 hold  - Supine Hip Adduction Isometric with Ball  - 1 x daily - 7 x weekly - 2 sets - 10 reps - 5 hold

## 2024-10-02 ENCOUNTER — TREATMENT (OUTPATIENT)
Dept: PHYSICAL THERAPY | Facility: CLINIC | Age: 64
End: 2024-10-02
Payer: MEDICARE

## 2024-10-02 DIAGNOSIS — M51.369 LUMBAR DEGENERATIVE DISC DISEASE: ICD-10-CM

## 2024-10-02 DIAGNOSIS — M54.50 LOW BACK PAIN, UNSPECIFIED: ICD-10-CM

## 2024-10-02 PROCEDURE — 97112 NEUROMUSCULAR REEDUCATION: CPT | Mod: GP | Performed by: PHYSICAL THERAPIST

## 2024-10-02 PROCEDURE — 97110 THERAPEUTIC EXERCISES: CPT | Mod: GP | Performed by: PHYSICAL THERAPIST

## 2024-10-02 NOTE — PROGRESS NOTES
Physical Therapy  Physical Therapy Progress Note    Patient Name Christopher Ralph   MRN: [id number]  Today's Date: 10/2/2024  Time Calculation  Start Time: 1150  Stop Time: 1228  Time Calculation (min): 38 min    Insurance:    Visit #:   4    (4 visits at [facility] this year) KX at visit #16  Insurance Reviewed  (per information provided by  pre-cert team)   [facility] Certification date range:  9-11-24/12-9-24(signed)    Therapy Diagnoses:   Problem List Items Addressed This Visit             ICD-10-CM    Lumbar degenerative disc disease M51.369    Low back pain, unspecified M54.50     General:  Reason for visit: R Lumbar radiculopathy   Referred by: Elvin Cox MD appt:  in a few months, none scheduled yet  Preferred Name:  Terrance  Script:  PT  Onset Date:  11-1-23  Most recent assessment/re-assessment: 9-11-24  Email/phone #:  [email or web address]   Access Code: [email or web address]  Issued large 9 mm heel lift for L shoe    Subjective:   Patient reports:  that B hamstrings are really tight this morning.  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  Patient may look into getting a Swiss ball, advised to get 65 cm.    Have you fallen since last visit:  no    Precautions:  No fall risk  HTN, L hip ORIF 1968, Pacemaker, no e-stim, see meds in chart/no trunk extension due to spondylolisthesis.     Pain:  4/10  Location/Type of pain:  B LS and buttock tightness    HEP compliance/understanding:  yes    Objective:   Objective Measurements:    Function[facility] and had some increased soreness, advised to not do shoulder press/rotary torso/or back ext machine.      Treatment:   **= HEP progression today NV= Next visit  np= not performed  nb= non-billable  G= group HEP= discharged to [facility]  Therapeutic Exercise:     33 minutes  Nu-step(subjective taken/education/soft tissue warm up):    L3 10'      Standing hams and hip flexor/calf stretches: 30/3   Shuttle B Leg Press:  75#/ 2 x  "10  R/L:  50#/ 2 x 10 ea.   Shuttle B toe raises:  50#/ 2 x 10  R/L:  37#/ 2 x 10 ea.   Green Swiss ball seated trunk flexion x 3 directions:  10\" x 5 ea. Way  Green Swiss Ball LTR:  x 15  Green Swiss ball BKTC:  x 15    Instruct in proper [employer] program over next several visits    NO SHOULDER PRESS/ROTARY TORSO/BACK EXTENSION    Prec-cor Leg Press  Toe Raises(on Leg Press):   Seated Hip abd:   Seated Hip add:   Life Fitness Lat Pulldown:  Life Fitness Row:  Life Fitness Abdominal:    Life Fitness Hamstring curl:  Life Fitness Quad Machine:       Neuromuscular Re-education:    5 minutes  Airex 3 way hip B:  x 15 ea.  B UE support  Cable Column 4 way ambulation:  NV    Education:  exercise progression and cues for exercise, what machines to avoid in the gym.      Assessment:  Patient tolerated treatment well, did well with progression this date.    Patient needs continued work on/skilled PT for: core stabilization and strength to address remaining functional, objective and subjective deficits to allow them to return to prior /optimal level of function with ADLs.  Patient is progressing with goals: HEP compliance and has resumed partial gym workout  Skilled care:  exercise progression and cues.     Plan:    Continue to progress per poc:   NV add instruction in proper [employer] gym program and resisted walking on cable column.      HEP:      DLS 3 way pull down ,obliques, add sets,DLS SLR     Access Code: [email or web address]  URL: [email or web address]  Date: 09/18/2024  Prepared by: Rocio     Exercises  - Supine Lower Trunk Rotation  - 1 x daily - 7 x weekly - 2-3 sets - 10 reps  - Hooklying Single Knee to Chest Stretch  - 1 x daily - 7 x weekly - 1 sets - 10 reps - 10 second hold  - Supine Piriformis Stretch with Foot on Ground  - 1 x daily - 7 x weekly - 1 sets - 3 reps - 30 second hold  - Supine Posterior Pelvic Tilt  - 1 x daily - 7 x weekly - 2-3 sets - 10 reps - 5 second hold  - Supine Bridge  - 1 x daily - " 7 x weekly - 3 sets - 10 reps - 2-3 second hold  - Seated Flexion Stretch  - 1 x daily - 7 x weekly - 1 sets - 10 reps - 10 second hold hold  - Standing Hamstring Stretch on Chair  - 1 x daily - 7 x weekly - 1 sets - 3 reps - 30 hold  - Hip Flexor Stretch with Chair  - 1 x daily - 7 x weekly - 1 sets - 3 reps - 30 hold  - Supine Hip Adduction Isometric with Ball  - 1 x daily - 7 x weekly - 2 sets - 10 reps - 5 hold

## 2024-10-07 ENCOUNTER — TELEPHONE (OUTPATIENT)
Dept: PAIN MEDICINE | Facility: CLINIC | Age: 64
End: 2024-10-07
Payer: MEDICARE

## 2024-10-09 ENCOUNTER — TREATMENT (OUTPATIENT)
Dept: PHYSICAL THERAPY | Facility: CLINIC | Age: 64
End: 2024-10-09
Payer: MEDICARE

## 2024-10-09 DIAGNOSIS — M51.369 LUMBAR DEGENERATIVE DISC DISEASE: ICD-10-CM

## 2024-10-09 DIAGNOSIS — M54.50 LOW BACK PAIN, UNSPECIFIED: ICD-10-CM

## 2024-10-09 PROCEDURE — 97110 THERAPEUTIC EXERCISES: CPT | Mod: GP,CQ

## 2024-10-09 PROCEDURE — 97112 NEUROMUSCULAR REEDUCATION: CPT | Mod: GP,CQ

## 2024-10-09 NOTE — PROGRESS NOTES
Physical Therapy  Physical Therapy Progress Note    Patient Name Christopher Ralph   MRN: [id number]  Today's Date: 10/9/2024  Time Calculation  Start Time: 0230  Stop Time: 0310  Time Calculation (min): 40 min    Insurance:    Visit #:   5    (4 visits at [facility] this year) KX at visit #16  Insurance Reviewed  (per information provided by  pre-cert team)   [facility] Certification date range:  9-11-24/12-9-24(signed)    Therapy Diagnoses:   Problem List Items Addressed This Visit             ICD-10-CM    Lumbar degenerative disc disease M51.369    Low back pain, unspecified M54.50     General:  Reason for visit: R Lumbar radiculopathy   Referred by: Elvin Cox MD appt:  in a few months, none scheduled yet  Preferred Name:  Terrance  Script:  PT  Onset Date:  11-1-23  Most recent assessment/re-assessment: 9-11-24  Email/phone #:  [email or web address]   Access Code: [email or web address]  Issued large 9 mm heel lift for L shoe    Subjective:   Patient reports:  that B hamstrings are really tight this morning.  He still uses a shopping cart to walk at work as the day progresses. Has not gotten a  Swiss ball yet  though still plans to.    Have you fallen since last visit:  no    Precautions:  No fall risk  HTN, L hip ORIF 1968, Pacemaker, no e-stim, see meds in chart/no trunk extension due to spondylolisthesis.     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  NP  Shuttle B toe raises:  50#/ 2 x 10  R/L:  37#/  "2 x 10 ea. NP  Green Swiss ball seated trunk flexion x 3 directions:  10\" x 5 ea. Way  Green Swiss Ball LTR:  x 15  Green Swiss ball BKTC:  x 15    Instruct in proper [employer] program over next several visits    NO SHOULDER PRESS/ROTARY TORSO/BACK EXTENSION    Prec-cor Leg Press seat 6 85# 2 x 10  Toe Raises(on Leg Press):  seat 6 40# 2 x 10  Seated Hip abd: NV  Seated Hip add: NV  Life Fitness Lat Pulldown:  4 pins 10x   5 pins 10x   Life Fitness Row:  pt requested Pre-Cor 55# 15x each of 3    Life Fitness Abdominal:    Life Fitness Hamstring curl:  [employer] Quad Machine:       Neuromuscular Re-education:    10 minutes  Airex 3 way hip B:  x 15 ea.  B UE support  Cable Column 4 way ambulation:  20# forward/backward 5x each  10# side stepping 5x each way     Education:  gym exercise progression with instruction correct use of equipment     Assessment:  Patient tolerated treatment well, did well with progression this date.  Did report some LE fatigue though   Patient needs continued work on/skilled PT for: core stabilization and strength to address remaining functional, objective and subjective deficits to allow them to return to prior /optimal level of function with ADLs.  Patient is progressing with goals: HEP compliance and has resumed partial gym workout  Skilled care:  exercise progression and cues.     Plan:    Continue to progress per poc:   NV instruction in proper [employer] gym program for hip machines    HEP:         Access Code: [email or web address]  URL: [email or web address]  Date: 09/18/2024  Prepared by: Rocio     Exercises  - Supine Lower Trunk Rotation  - 1 x daily - 7 x weekly - 2-3 sets - 10 reps  - Hooklying Single Knee to Chest Stretch  - 1 x daily - 7 x weekly - 1 sets - 10 reps - 10 second hold  - Supine Piriformis Stretch with Foot on Ground  - 1 x daily - 7 x weekly - 1 sets - 3 reps - 30 second hold  - Supine Posterior Pelvic Tilt  - 1 x daily - 7 x weekly - 2-3 sets - 10 reps - 5 " second hold  - Supine Bridge  - 1 x daily - 7 x weekly - 3 sets - 10 reps - 2-3 second hold  - Seated Flexion Stretch  - 1 x daily - 7 x weekly - 1 sets - 10 reps - 10 second hold hold  - Standing Hamstring Stretch on Chair  - 1 x daily - 7 x weekly - 1 sets - 3 reps - 30 hold  - Hip Flexor Stretch with Chair  - 1 x daily - 7 x weekly - 1 sets - 3 reps - 30 hold  - Supine Hip Adduction Isometric with Ball  - 1 x daily - 7 x weekly - 2 sets - 10 reps - 5 hold

## 2024-10-16 ENCOUNTER — TREATMENT (OUTPATIENT)
Dept: PHYSICAL THERAPY | Facility: CLINIC | Age: 64
End: 2024-10-16
Payer: MEDICARE

## 2024-10-16 DIAGNOSIS — M51.369 LUMBAR DEGENERATIVE DISC DISEASE: ICD-10-CM

## 2024-10-16 DIAGNOSIS — M54.50 LOW BACK PAIN, UNSPECIFIED: ICD-10-CM

## 2024-10-16 PROCEDURE — 97110 THERAPEUTIC EXERCISES: CPT | Mod: GP | Performed by: PHYSICAL THERAPIST

## 2024-10-16 NOTE — PROGRESS NOTES
Physical Therapy  Physical Therapy Progress Note    Patient Name Christopher Ralph   MRN: [id number]  Today's Date: 10/16/2024  Time Calculation  Start Time: 0745  Stop Time: 0823  Time Calculation (min): 38 min    Insurance:    Visit #:   6    (4 visits at [facility] this year) KX at visit #16  Insurance Reviewed  (per information provided by  pre-cert team)   [facility] Certification date range:  9-11-24/12-9-24(signed)    Therapy Diagnoses:   Problem List Items Addressed This Visit             ICD-10-CM    Lumbar degenerative disc disease M51.369    Relevant Orders    Follow Up In Physical Therapy    Low back pain, unspecified M54.50    Relevant Orders    Follow Up In Physical Therapy     General:  Reason for visit: R Lumbar radiculopathy   Referred by: Elvin Cox MD appt:  in a few months, none scheduled yet  Preferred Name:  Terrance  Script:  PT  Onset Date:  11-1-23  Most recent assessment/re-assessment: 9-11-24  Email/phone #:  [email or web address]   Access Code: [email or web address]  Issued large 9 mm heel lift for L shoe    Subjective:   Patient reports:  that he is sore after working last night and only had 4 hours of sleep.      Have you fallen since last visit:  no    Precautions:  No fall risk  HTN, L hip ORIF 1968, Pacemaker, no e-stim, see meds in chart/no trunk extension due to spondylolisthesis.     Pain:  7/10  Location/Type of pain:  L buttock aching    HEP compliance/understanding:  yes    Objective:   Objective Measurements:    Function:   sore after shift of work  Posture: moderate kyphosis    Treatment:   **= HEP progression today NV= Next visit  np= not performed  nb= non-billable  G= group HEP= discharged to [facility]  Therapeutic Exercise:     38 Minutes    Nu-step(subjective taken/education/soft tissue warm up):    L3 8'      Standing hams and hip flexor/calf stretches: 30/3      NO SHOULDER PRESS/ROTARY TORSO/BACK EXTENSION   Instruct in proper [employer] program over next several visits    Prec-cor Leg Press seat 6 85#  "2 x 10/np  Toe Raises(on Leg Press):  seat 6 40# 2 x 10/np  Seated Hip abd: Start:  0/40#/ 2 x 10  Seated Hip add: Start:  2 /40#/ 2 x 10  Life Fitness Lat Pulldown:  4 pins 10x   5 pins 10x   Life Fitness Row:  pt requested Pre-Cor 55# 15x each of 3    Life Fitness Abdominal:  6 Pegs/ 2 x 10  Life Fitness Hamstring curl: 6 Pegs/ 2 x 10  Precor Quad Machine:   Seat:  4/40#/ 2 x 10    Green Swiss ball seated flexion stretch x 3 directions:  10\" x 5 ea.     Education:  progression of gym program/issued hand out of settings.      Assessment:  Patient tolerated treatment well, did well with progression this date.    Patient needs continued work on/skilled PT for: core stability and strength and posture to address remaining functional, objective and subjective deficits to allow them to return to prior /optimal level of function with ADLs.  Patient is progressing with goals: slightly less pain overall, HEP compliance  Skilled care:  exercise and HEP progression    Plan:    Continue to progress per poc:   NV add instruction in cable column activities to simulate theraband program.     HEP:    Access Code: [email or web address]  URL: [email or web address]  Date: 09/18/2024  Prepared by: Rocio     Exercises  - Supine Lower Trunk Rotation  - 1 x daily - 7 x weekly - 2-3 sets - 10 reps  - Hooklying Single Knee to Chest Stretch  - 1 x daily - 7 x weekly - 1 sets - 10 reps - 10 second hold  - Supine Piriformis Stretch with Foot on Ground  - 1 x daily - 7 x weekly - 1 sets - 3 reps - 30 second hold  - Supine Posterior Pelvic Tilt  - 1 x daily - 7 x weekly - 2-3 sets - 10 reps - 5 second hold  - Supine Bridge  - 1 x daily - 7 x weekly - 3 sets - 10 reps - 2-3 second hold  - Seated Flexion Stretch  - 1 x daily - 7 x weekly - 1 sets - 10 reps - 10 second hold hold  - Standing Hamstring Stretch on Chair  - 1 x daily - 7 x weekly - 1 sets - 3 reps - 30 hold  - Hip Flexor Stretch with Chair  - 1 x daily - 7 x weekly - 1 " sets - 3 reps - 30 hold  - Supine Hip Adduction Isometric with Ball  - 1 x daily - 7 x weekly - 2 sets - 10 reps - 5 hold

## 2024-10-23 ENCOUNTER — TREATMENT (OUTPATIENT)
Dept: PHYSICAL THERAPY | Facility: CLINIC | Age: 64
End: 2024-10-23
Payer: MEDICARE

## 2024-10-23 DIAGNOSIS — M54.50 LOW BACK PAIN, UNSPECIFIED: ICD-10-CM

## 2024-10-23 DIAGNOSIS — M51.369 LUMBAR DEGENERATIVE DISC DISEASE: ICD-10-CM

## 2024-10-23 PROCEDURE — 97110 THERAPEUTIC EXERCISES: CPT | Mod: GP,CQ

## 2024-10-23 NOTE — PROGRESS NOTES
"Physical Therapy  Physical Therapy Progress Note    Patient Name Christopher Ralph   MRN: [id number]  Today's Date: 10/23/2024  Time Calculation  Start Time: 0745  Stop Time: 0830  Time Calculation (min): 45 min    Insurance:    Visit #:   7    (4 visits at [facility] this year) KX at visit #16  Insurance Reviewed  (per information provided by  pre-cert team)   [facility] Certification date range:  9-11-24/12-9-24(signed)    Therapy Diagnoses:   Problem List Items Addressed This Visit             ICD-10-CM    Lumbar degenerative disc disease M51.369    Low back pain, unspecified M54.50     General:  Reason for visit: R Lumbar radiculopathy   Referred by: Elvin Cox MD appt:  in a few months, none scheduled yet  Preferred Name:  Terrance  Script:  PT  Onset Date:  11-1-23  Most recent assessment/re-assessment: 9-11-24  Email/phone #:  [email or web address]   Access Code: [email or web address]  Issued large 9 mm heel lift for L shoe    Subjective:   Patient reports:  that he has an appt for pain management next week . Sleeping has been \"all right\" Still uses cart at work for walking around as needed, which per pt. is often.    Have you fallen since last visit:  no    Precautions:  No fall risk  HTN, L hip ORIF 1968, Pacemaker, no e-stim, see meds in chart/no trunk extension due to spondylolisthesis.     Pain:  5/10  Location/Type of pain:  R buttock aching    HEP compliance/understanding:  yes    Objective:   Objective Measurements:    Function:   sore after shift of work  Posture: moderate kyphosis    Treatment:   **= HEP progression today NV= Next visit  np= not performed  nb= non-billable  G= group HEP= discharged to [facility]  Therapeutic Exercise:     45 Minutes    Nu-step(subjective taken/education/soft tissue warm up):    L3 8'      Standing hams and hip flexor/calf stretches: 30/3   Cable column pull back /forward 20# 2 x 10  Cable column obliques 20# 2 x 10  Cable column abdominal /diagonal pull downs 20# 2 x 10      NO SHOULDER " "PRESS/ROTARY TORSO/BACK EXTENSION   Instruct in proper [employer] program over next several visits    Prec-cor Leg Press seat 6 45# 2 x 10  Toe Raises(on Leg Press):  seat 6 40# 2 x 10  Seated Hip abd: Start:  0/40#/ 2 x 10  Seated Hip add: Start:  2 /40#/ 2 x 10  Life Fitness Lat Pulldown:  4 pins 10x   5 pins 10x   Life Fitness Row:  pt requested Pre-Cor 55# 15x each of 3    Life Fitness Abdominal:  6 Pegs/ 2 x 10  Life Fitness Hamstring curl: 6 Pegs/ 2 x 10  Precor Quad Machine:   Seat:  4/40#x 10    Green Swiss ball seated flexion stretch x 3 directions:  10\" x 5 ea.     Education:  progression of gym program     Assessment:  Patient tolerated treatment well, did well with progression this date.  Instructed in cable column for core stabilization.  Patient needs continued work on/skilled PT for: core stability and strength and posture to address remaining functional, objective and subjective deficits to allow them to return to prior /optimal level of function with ADLs.  Patient is progressing with goals:  HEP compliance/understanding of gym machines  Skilled care:  exercise and HEP progression    Plan:    Continue to progress per poc:   NV progress gym strengthening and I in use of equipment    HEP:    Access Code: [email or web address]  URL: [email or web address]  Date: 09/18/2024  Prepared by: Rocio     Exercises  - Supine Lower Trunk Rotation  - 1 x daily - 7 x weekly - 2-3 sets - 10 reps  - Hooklying Single Knee to Chest Stretch  - 1 x daily - 7 x weekly - 1 sets - 10 reps - 10 second hold  - Supine Piriformis Stretch with Foot on Ground  - 1 x daily - 7 x weekly - 1 sets - 3 reps - 30 second hold  - Supine Posterior Pelvic Tilt  - 1 x daily - 7 x weekly - 2-3 sets - 10 reps - 5 second hold  - Supine Bridge  - 1 x daily - 7 x weekly - 3 sets - 10 reps - 2-3 second hold  - Seated Flexion Stretch  - 1 x daily - 7 x weekly - 1 sets - 10 reps - 10 second hold hold  - Standing Hamstring Stretch on Chair  " - 1 x daily - 7 x weekly - 1 sets - 3 reps - 30 hold  - Hip Flexor Stretch with Chair  - 1 x daily - 7 x weekly - 1 sets - 3 reps - 30 hold  - Supine Hip Adduction Isometric with Ball  - 1 x daily - 7 x weekly - 2 sets - 10 reps - 5 hold

## 2024-10-30 ENCOUNTER — OFFICE VISIT (OUTPATIENT)
Dept: PAIN MEDICINE | Facility: CLINIC | Age: 64
End: 2024-10-30
Payer: MEDICARE

## 2024-10-30 ENCOUNTER — TREATMENT (OUTPATIENT)
Dept: PHYSICAL THERAPY | Facility: CLINIC | Age: 64
End: 2024-10-30
Payer: MEDICARE

## 2024-10-30 VITALS
SYSTOLIC BLOOD PRESSURE: 126 MMHG | HEIGHT: 70 IN | TEMPERATURE: 97.7 F | WEIGHT: 225 LBS | BODY MASS INDEX: 32.21 KG/M2 | OXYGEN SATURATION: 96 % | DIASTOLIC BLOOD PRESSURE: 67 MMHG | HEART RATE: 60 BPM

## 2024-10-30 DIAGNOSIS — M43.17 SPONDYLOLISTHESIS, LUMBOSACRAL REGION: ICD-10-CM

## 2024-10-30 DIAGNOSIS — R29.898 RIGHT LEG WEAKNESS: ICD-10-CM

## 2024-10-30 DIAGNOSIS — M54.50 LOW BACK PAIN, UNSPECIFIED: ICD-10-CM

## 2024-10-30 DIAGNOSIS — M51.362 DEGENERATION OF INTERVERTEBRAL DISC OF LUMBAR REGION WITH DISCOGENIC BACK PAIN AND LOWER EXTREMITY PAIN: Primary | ICD-10-CM

## 2024-10-30 DIAGNOSIS — M54.16 LUMBAR NEURITIS: ICD-10-CM

## 2024-10-30 DIAGNOSIS — M41.25 OTHER IDIOPATHIC SCOLIOSIS, THORACOLUMBAR REGION: ICD-10-CM

## 2024-10-30 DIAGNOSIS — M51.369 LUMBAR DEGENERATIVE DISC DISEASE: ICD-10-CM

## 2024-10-30 PROCEDURE — 97110 THERAPEUTIC EXERCISES: CPT | Mod: GP,CQ

## 2024-10-30 PROCEDURE — 99214 OFFICE O/P EST MOD 30 MIN: CPT | Performed by: ANESTHESIOLOGY

## 2024-10-30 RX ORDER — GABAPENTIN 300 MG/1
300 CAPSULE ORAL 2 TIMES DAILY
Qty: 60 CAPSULE | Refills: 11 | Status: SHIPPED | OUTPATIENT
Start: 2024-10-30 | End: 2024-11-29

## 2024-10-30 ASSESSMENT — PAIN DESCRIPTION - DESCRIPTORS: DESCRIPTORS: SHARP;ACHING

## 2024-10-30 ASSESSMENT — PATIENT HEALTH QUESTIONNAIRE - PHQ9
SUM OF ALL RESPONSES TO PHQ9 QUESTIONS 1 AND 2: 0
2. FEELING DOWN, DEPRESSED OR HOPELESS: NOT AT ALL
1. LITTLE INTEREST OR PLEASURE IN DOING THINGS: NOT AT ALL

## 2024-10-30 ASSESSMENT — COLUMBIA-SUICIDE SEVERITY RATING SCALE - C-SSRS
2. HAVE YOU ACTUALLY HAD ANY THOUGHTS OF KILLING YOURSELF?: NO
6. HAVE YOU EVER DONE ANYTHING, STARTED TO DO ANYTHING, OR PREPARED TO DO ANYTHING TO END YOUR LIFE?: NO
1. IN THE PAST MONTH, HAVE YOU WISHED YOU WERE DEAD OR WISHED YOU COULD GO TO SLEEP AND NOT WAKE UP?: NO

## 2024-10-30 ASSESSMENT — PAIN SCALES - GENERAL
PAINLEVEL_OUTOF10: 7
PAINLEVEL_OUTOF10: 7

## 2024-10-30 ASSESSMENT — ENCOUNTER SYMPTOMS
LOSS OF SENSATION IN FEET: 0
OCCASIONAL FEELINGS OF UNSTEADINESS: 0

## 2024-10-30 ASSESSMENT — PAIN - FUNCTIONAL ASSESSMENT: PAIN_FUNCTIONAL_ASSESSMENT: 0-10

## 2024-11-06 ENCOUNTER — APPOINTMENT (OUTPATIENT)
Dept: CARDIOLOGY | Facility: CLINIC | Age: 64
End: 2024-11-06
Payer: MEDICARE

## 2024-11-06 ENCOUNTER — HOSPITAL ENCOUNTER (OUTPATIENT)
Dept: RADIOLOGY | Facility: CLINIC | Age: 64
Discharge: HOME | End: 2024-11-06
Payer: MEDICARE

## 2024-11-06 ENCOUNTER — HOSPITAL ENCOUNTER (OUTPATIENT)
Dept: CARDIOLOGY | Facility: CLINIC | Age: 64
Discharge: HOME | End: 2024-11-06
Payer: MEDICARE

## 2024-11-06 ENCOUNTER — TREATMENT (OUTPATIENT)
Dept: PHYSICAL THERAPY | Facility: CLINIC | Age: 64
End: 2024-11-06
Payer: MEDICARE

## 2024-11-06 DIAGNOSIS — R29.898 RIGHT LEG WEAKNESS: ICD-10-CM

## 2024-11-06 DIAGNOSIS — M51.369 LUMBAR DEGENERATIVE DISC DISEASE: ICD-10-CM

## 2024-11-06 DIAGNOSIS — M54.50 LOW BACK PAIN, UNSPECIFIED: ICD-10-CM

## 2024-11-06 DIAGNOSIS — I44.2 COMPLETE HEART BLOCK: ICD-10-CM

## 2024-11-06 DIAGNOSIS — M51.362 DEGENERATION OF INTERVERTEBRAL DISC OF LUMBAR REGION WITH DISCOGENIC BACK PAIN AND LOWER EXTREMITY PAIN: ICD-10-CM

## 2024-11-06 DIAGNOSIS — Z95.0 PRESENCE OF CARDIAC PACEMAKER: ICD-10-CM

## 2024-11-06 PROCEDURE — 93294 REM INTERROG EVL PM/LDLS PM: CPT | Performed by: INTERNAL MEDICINE

## 2024-11-06 PROCEDURE — 93296 REM INTERROG EVL PM/IDS: CPT

## 2024-11-06 PROCEDURE — 97110 THERAPEUTIC EXERCISES: CPT | Mod: GP | Performed by: PHYSICAL THERAPIST

## 2024-11-06 PROCEDURE — 72131 CT LUMBAR SPINE W/O DYE: CPT

## 2024-11-06 NOTE — PROGRESS NOTES
Physical Therapy  Physical Therapy Progress Note/re-assessment    Patient Name Christopher Ralph   MRN: [id number]  Today's Date: 11/6/2024  Time Calculation  Start Time: 0345  Stop Time: 0423  Time Calculation (min): 38 min    Insurance:    Visit #:   9    (4 visits at [facility] this year) KX at visit #16  Insurance Reviewed  (per information provided by  pre-cert team)   [facility] Certification date range:  9-11-24/12-9-24(signed)    Therapy Diagnoses:   Problem List Items Addressed This Visit             ICD-10-CM    Lumbar degenerative disc disease M51.369    Low back pain, unspecified M54.50     General:  Reason for visit: R Lumbar radiculopathy   Referred by: Elvin Cox MD appt:  in a few months, none scheduled yet  Preferred Name:  Terrance  Script:  PT  Onset Date:  11-1-23  Most recent assessment/re-assessment: 9-11-24/11-6-24  Email/phone #:  [email or web address]   Access Code: [email or web address]  Issued large 9 mm heel lift for L shoe    Subjective:   Patient reports:  that he is stiff today coming from work.  Patient reports that he had CT scan of LS    Have you fallen since last visit:  no    Precautions:   No fall risk  HTN, L hip ORIF 1968, Pacemaker, no e-stim, see meds in chart/no trunk extension due to spondylolisthesis.     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      Posture:  minimally for flexed posture, severely increased thoracic kyphosis and L pelvic landmarks low, L severe subtalar joint pronation/recommended custom or at least OTC arch supports and issued large 9 mm heel lift for the L shoe with some improvements noted in pelvic symmetry/L LE ER at hip/L genu valgus.    11-6-24:  wearing heel lift, but has not looked into OTC arch supports, reiterated importance of this, increased awareness of proper posture.      Gait/Stairs: moderately decreased trunk rotation and hip extension. L LE ER at hip throughout cycle, and mod L glut med lurch and reciprocal pattern on stairs with light rail support.   11-6-24:  Improvements noted in hip extension and trunk rotation and less L glut med lurch, reciprocal pattern on stairs, but some decreased motor control with descending steps.      Palpation:  - pop    ROM:  Trunk Flexion:  WNL (was 1\" 3rd to floor)  Extension: 0/20(was 0/16)  RSB:  WNL (was 1\" 3rd to fibular head)  LSB: WNL (was 1\" 3rd to fibular head)  RR: 50%(was 25%)   LR: 50%(was 25%)     MMT:  Abd: 4+/5(was 4/5)  Bridge: 75%(was 50%)  B LE myotomes:  WNL and symmetrical except L hip flexors:  4/5(was 4-/5)     Flexibility:  Hamstrings:  90/90:  R:   -16(was -25)  L: -16(was -30)  Heelcords: 0 B No change  Hip flexors: min/mod (was mod) B  Gluts: min(was mod) B  Piriformis:  min(was mod) B     Neuro: no change but Gabapentin seems to be helping with overall pain.      Treatment:   **= HEP progression today NV= Next visit  np= not performed  nb= non-billable  G= group HEP= discharged to HEP  Therapeutic Exercise:     38 minutes  Nu-step(soft tissue warm up and subjective taken):  Lev 3 x 10'  Re-assessment    Education:  poc    Assessment:  Skilled care:  re-assessment    STG:    -Increased postural awareness.  Met  -Compliant with HEP.  Met  LTG:  by discharge  -Increased postural " awareness and posture WFL.  Progressing  - pain to:  2/10 at worst and patient I with self management of symptoms.  Progressing  -Decreased pain and increased function with ADL's and IADL's.  Improve Oswestry  to: 15%  limitation of function.  Progressing  -Normal gait on level and uneven surfaces community level distances for improved function in the community.  Progressing  -Increase trunk AROM to WFL for improved function with daily activities.  Progressing  -Increase trunk strength and stability and R/L LE strength to WFL for improved function with chores/work duties.  Progressing  -Increase B LE flexibility to WFL.    Progressing  -Decrease R LE radicular symptoms 50-75% per patient report.  No change  -I and compliant with HEP and proper: LE/lower back care/I with proper Y work out.  Progressing    Plan:    Continue to progress per poc:   NV review CT scan results and discuss with patient continuing with HEP vs. A few more visits to insure I with HEP and gym  program.      HEP:        DLS 3 way pull down ,obliques, add sets,DLS SLR     Access Code: [email or web address]  URL: [email or web address]  Date: 2024  Prepared by: Rocio     Exercises  - Supine Lower Trunk Rotation  - 1 x daily - 7 x weekly - 2-3 sets - 10 reps  - Hooklying Single Knee to Chest Stretch  - 1 x daily - 7 x weekly - 1 sets - 10 reps - 10 second hold  - Supine Piriformis Stretch with Foot on Ground  - 1 x daily - 7 x weekly - 1 sets - 3 reps - 30 second hold  - Supine Posterior Pelvic Tilt  - 1 x daily - 7 x weekly - 2-3 sets - 10 reps - 5 second hold  - Supine Bridge  - 1 x daily - 7 x weekly - 3 sets - 10 reps - 2-3 second hold  - Seated Flexion Stretch  - 1 x daily - 7 x weekly - 1 sets - 10 reps - 10 second hold hold  - Standing Hamstring Stretch on Chair  - 1 x daily - 7 x weekly - 1 sets - 3 reps - 30 hold  - Hip Flexor Stretch with Chair  - 1 x daily - 7 x weekly - 1 sets - 3 reps - 30 hold  - Supine Hip  Adduction Isometric with Ball  - 1 x daily - 7 x weekly - 2 sets - 10 reps - 5 hold

## 2024-11-13 ENCOUNTER — TREATMENT (OUTPATIENT)
Dept: PHYSICAL THERAPY | Facility: CLINIC | Age: 64
End: 2024-11-13
Payer: MEDICARE

## 2024-11-13 ENCOUNTER — HOSPITAL ENCOUNTER (OUTPATIENT)
Dept: CARDIOLOGY | Facility: CLINIC | Age: 64
Discharge: HOME | End: 2024-11-13
Payer: MEDICARE

## 2024-11-13 DIAGNOSIS — M51.369 LUMBAR DEGENERATIVE DISC DISEASE: ICD-10-CM

## 2024-11-13 DIAGNOSIS — M54.50 LOW BACK PAIN, UNSPECIFIED: ICD-10-CM

## 2024-11-13 DIAGNOSIS — Z95.0 PRESENCE OF CARDIAC PACEMAKER: ICD-10-CM

## 2024-11-13 PROCEDURE — 97112 NEUROMUSCULAR REEDUCATION: CPT | Mod: GP | Performed by: PHYSICAL THERAPIST

## 2024-11-13 PROCEDURE — 97110 THERAPEUTIC EXERCISES: CPT | Mod: GP | Performed by: PHYSICAL THERAPIST

## 2024-11-13 NOTE — PROGRESS NOTES
Physical Therapy  Physical Therapy Progress Note    Patient Name Christopher Ralph   MRN: [id number]  Today's Date: 11/13/2024  Time Calculation  Start Time: 0915  Stop Time: 0953  Time Calculation (min): 38 min    Insurance:    Visit #:   10    (4 visits at [facility] this year) KX at visit #16  Insurance Reviewed  (per information provided by  pre-cert team)   [facility] Certification date range:  9-11-24/12-9-24(signed)    Therapy Diagnoses:   Problem List Items Addressed This Visit             ICD-10-CM    Lumbar degenerative disc disease M51.369    Low back pain, unspecified M54.50     General:  Reason for visit: R Lumbar radiculopathy   Referred by: Elvin Cox MD appt:  in a few months, none scheduled yet/12-4-24  Preferred Name:  Terrance  Script:  PT  Onset Date:  11-1-23  Most recent assessment/re-assessment: 9-11-24/11-6-24  Email/phone #:  [email or web address]   Access Code: [email or web address]  Issued large 9 mm heel lift for L shoe    Subjective:   Patient reports:  that the R buttock is aching.  Patient reports that results of CS scan are in.  Severe bony encroachment at T12-L1 foramen.  Patient will follow up with Dr. Ho 12-4-24.  Patient feels that he knows HEP and gym program and can make today the last visit.    Have you fallen since last visit:  no    Precautions:  No fall risk  HTN, L hip ORIF 1968, Pacemaker, no e-stim, see meds in chart/no trunk extension due to spondylolisthesis.     Pain:  4-5/10  Location/Type of pain:  aching R buttock.     HEP compliance/understanding:  yes    Objective:   Objective Measurements:    Function:   I with gym program, sore with work activities.   Posture: mod increased thoracic kyphosis.      Treatment:   **= HEP progression today NV= Next visit  np= not performed  nb= non-billable  G= group HEP= discharged to HEP  Therapeutic Exercise:     25 minutes  Nu-step(subjective taken/education/soft tissue warm up):    L3 8'      Standing hams and hip flexor/calf stretches: 30/3   Green  "Swiss ball seated flexion stretch x 3 directions: 10\" x 5 ea.   LTR with Green Swiss ball:  x 20  B KTC with Green Swiss ball:  x 20    Neuromuscular Re-education:    15 minutes  Cable Column(both pulleys):  40#/ 2 x 10 ea.  40# x 10 then 30# x 10 for oblique pull downs to R /30# 2 x 10 to L  Cable Column Obliques:  30#(B pulleys):  2 x 10  Cable Column 3 level rows:  40#(B pulleys):  2 x 10 ea.    Cable column pull back 30#(B pulleys):  2 x 10     Education:  Review of cable column dynamic stabilization    Assessment:  Patient tolerated treatment well, did well with progression this date.    STG:    -Increased postural awareness.  Met  -Compliant with HEP.  Met  LTG:  by discharge  -Increased postural awareness and posture WFL.  Partially Met  - pain to:  2/10 at worst and patient I with self management of symptoms.  Partially Met  -Decreased pain and increased function with ADL's and IADL's.  Improve Oswestry  to: 15%  limitation of function.  Partially Met  -Normal gait on level and uneven surfaces community level distances for improved function in the community.  Partially Met  -Increase trunk AROM to WFL for improved function with daily activities.  Met  -Increase trunk strength and stability and R/L LE strength to WFL for improved function with chores/work duties. Met  -Increase B LE flexibility to WFL.   Met  -Decrease R LE radicular symptoms 50-75% per patient report.  Not Met  -I and compliant with HEP and proper: LE/lower back care/I with proper Y work out.  Met    Skilled care:  Review of goals and review of cable column dynamic stabilization/education.  Plan:    Continue with HEP and gym program and follow up with MD in December    HEP:        DLS 3 way pull down ,obliques, add sets,DLS SLR      Access Code: [email or web address]  URL: [email or web address]  Date: 2024  Prepared by: Rocio     Exercises  - Supine Lower Trunk Rotation  - 1 x daily - 7 x weekly - 2-3 sets - 10 reps  - " Hooklying Single Knee to Chest Stretch  - 1 x daily - 7 x weekly - 1 sets - 10 reps - 10 second hold  - Supine Piriformis Stretch with Foot on Ground  - 1 x daily - 7 x weekly - 1 sets - 3 reps - 30 second hold  - Supine Posterior Pelvic Tilt  - 1 x daily - 7 x weekly - 2-3 sets - 10 reps - 5 second hold  - Supine Bridge  - 1 x daily - 7 x weekly - 3 sets - 10 reps - 2-3 second hold  - Seated Flexion Stretch  - 1 x daily - 7 x weekly - 1 sets - 10 reps - 10 second hold hold  - Standing Hamstring Stretch on Chair  - 1 x daily - 7 x weekly - 1 sets - 3 reps - 30 hold  - Hip Flexor Stretch with Chair  - 1 x daily - 7 x weekly - 1 sets - 3 reps - 30 hold  - Supine Hip Adduction Isometric with Ball  - 1 x daily - 7 x weekly - 2 sets - 10 reps - 5 hold

## 2024-11-14 PROBLEM — M25.551 PAIN OF RIGHT HIP: Status: ACTIVE | Noted: 2024-08-20

## 2024-12-04 ENCOUNTER — OFFICE VISIT (OUTPATIENT)
Dept: PAIN MEDICINE | Facility: CLINIC | Age: 64
End: 2024-12-04
Payer: MEDICARE

## 2024-12-04 VITALS
OXYGEN SATURATION: 99 % | RESPIRATION RATE: 15 BRPM | BODY MASS INDEX: 32.21 KG/M2 | WEIGHT: 225 LBS | HEART RATE: 68 BPM | TEMPERATURE: 97.9 F | DIASTOLIC BLOOD PRESSURE: 74 MMHG | SYSTOLIC BLOOD PRESSURE: 159 MMHG | HEIGHT: 70 IN

## 2024-12-04 DIAGNOSIS — M54.16 LUMBAR NEURITIS: Primary | ICD-10-CM

## 2024-12-04 DIAGNOSIS — M51.362 DEGENERATION OF INTERVERTEBRAL DISC OF LUMBAR REGION WITH DISCOGENIC BACK PAIN AND LOWER EXTREMITY PAIN: ICD-10-CM

## 2024-12-04 DIAGNOSIS — M54.42 CHRONIC BILATERAL LOW BACK PAIN WITH BILATERAL SCIATICA: ICD-10-CM

## 2024-12-04 DIAGNOSIS — G89.29 CHRONIC BILATERAL LOW BACK PAIN WITH BILATERAL SCIATICA: ICD-10-CM

## 2024-12-04 DIAGNOSIS — M54.41 CHRONIC BILATERAL LOW BACK PAIN WITH BILATERAL SCIATICA: ICD-10-CM

## 2024-12-04 PROCEDURE — 99214 OFFICE O/P EST MOD 30 MIN: CPT | Performed by: ANESTHESIOLOGY

## 2024-12-04 ASSESSMENT — PAIN SCALES - GENERAL
PAINLEVEL_OUTOF10: 7
PAINLEVEL_OUTOF10: 7

## 2024-12-04 ASSESSMENT — PAIN - FUNCTIONAL ASSESSMENT
PAIN_FUNCTIONAL_ASSESSMENT: 0-10
PAIN_FUNCTIONAL_ASSESSMENT: 0-10

## 2024-12-04 ASSESSMENT — PAIN DESCRIPTION - DESCRIPTORS: DESCRIPTORS: THROBBING

## 2024-12-04 ASSESSMENT — ENCOUNTER SYMPTOMS
OCCASIONAL FEELINGS OF UNSTEADINESS: 0
LOSS OF SENSATION IN FEET: 1

## 2024-12-04 NOTE — PROGRESS NOTES
History Of Present Illness  Christopher Ralph is a 64 y.o. male presenting with   Chief Complaint   Patient presents with    Follow-up       Patient presents with complaints of chronic low back pain to the right hip/buttock and groin area. The pain is constant, worse with standing/walking and better with sitting. The pain is sharp, stabbing and shooting to the RLE. Denies LE paresthesias, weakness, saddle anesthesia, bowel or bladder incontinence. To manage this pain the patient has attempted Diclofenac with no relief of his pain. The patient has undergone PT at [facility]. The patients chronic HTN on ASA has Pacemaker are stable on medication management.     PAIN SCORE: 7/10.    PCP: Dr. Agarwal  R: Dr. Ho  Cards: Dr. Tripathi     Past Medical History  He has a past medical history of Aortic stenosis (12/21/2023), Bilateral leg edema (01/03/2024), Complete heart block (12/21/2023), Left bundle branch block (LBBB) (12/21/2023), Overweight, Pacemaker (12/21/2023), Personal history of other diseases of the digestive system, Personal history of other specified conditions, Pulmonary embolism without acute cor pulmonale (12/21/2023), Thoracic aortic aneurysm (CMS-HCC) (12/21/2023), and Thoracic aortic aneurysm, without rupture, unspecified (CMS-HCC) (08/26/2021).    Surgical History  He has a past surgical history that includes Appendectomy (10/02/2017) and Other surgical history (08/12/2021).     Social History  He reports that he quit smoking about 3 years ago. His smoking use included cigarettes. He started smoking about 33 years ago. He has a 7.5 pack-year smoking history. He has been exposed to tobacco smoke. He has never used smokeless tobacco. He reports current alcohol use. He reports that he does not use drugs.    Family History  No family history on file.     Allergies  Patient has no known allergies.    Review of Systems    All other systems reviewed and negative for any deficits. Pertinent positives and negatives  were considered in the medical decision making process.        Physical Exam  /74   Pulse 68   Temp 36.6 °C (97.9 °F)   Resp 15   Wt 102 kg (225 lb)   SpO2 99%     General: Pt appears stated age    Eyes: Conjunctiva non-icteric and lids without obvious rash or drooping. Pupils are symmetric.    ENT: External ears and nose appear to be without deformity or rash. No lesions or masses noted. Hearing is grossly intact.    Respiratory: No gasping or shortness of breath noted. No use of accessory muscles noted.    CVS: Extremities show no edema or varicosities    Skin: No rashes or open lesions/ulcers identified on skin. No induration/tightening noted with palpation of skin.     Musculoskeletal: Brandon is grossly normal.    Stability: No subluxation noted on movement of bilateral upper extremities or head/neck.     Strength: 3/5 in RLE and 4/5 in LLE     Range of Motion: WNL    Neurologic: Reflexes 2+    Sensation: dec to sharp touch along L5 dermatome    Neurologic: Cranial Nerves II thru XII are grossly intact    Psychiatric: Pt is alert and oriented to time, person, and place           Assessment/Plan   1. Degeneration of intervertebral disc of lumbar region with discogenic back pain and lower extremity pain        2. Lumbar neuritis        3. Chronic bilateral low back pain with bilateral sciatica            1.I have provided the patient with a list of physical therapy exercises to learn and perform to strengthen core, maintain stabilization, and reduce pain. We reviewed the exercises in detail and I encouraged them to perform them on a regular basis.    He has completed a 6 week course of therapy at [facility] in [place]  He also completed a 6 week course of therapy at [facility] in January of 2024    2. I would recommend the pt start on Gabapentin 300mg BID to help with nerve related pain. We discussed the risks, benefits, and side effects to this medication including the mechanism of action and the pt understands and  agrees.    3. I extensively reviewed the patients CT findings (11-9-2024) in detail, including review of the actual images and provided a detailed explanation of the findings using a spine model.     There is noted worsening spondylolisthesis of L4 on L5 and a disc herniation at this level as well.     4. The patient is a candidate for an LESI L5/S1 to treat low back and radicular pain. I spent time with the patient discussing the risks, benefits, and alternatives to this measure including, but not limited to worsening pain with injection, no improvement/guarantee with the procedure, numbness in the lower extremity and risk of falls post procedure, vagal reaction/ hypotension, feeling dizzy / lightheaded, spinal infection, worsening pre-existing infections, epidural hematoma, epidural abscess, nerve injury, paralysis, spinal fluid leak and spinal headache. The patient understands all of these risks and agrees to proceed with the planned procedure.     We will contact the patients PCP to determine if it is safe to stop ASA 7 days prior to procedure. If Pt is to be bridged on Lovenox they will need to be off of Lovenox for 24 hours prior to the procedure.  We did discuss the risks for stopping anticoagulation including, but not limited to any cardiovascular event, embolism, and even death. The patient understands these risks and would like to proceed with the procedure.    5. Given the severity of his x-ray findings and that he has failed over 6 weeks of PT twice and 6 weeks of Diclofenac, Tylenol, Advil with no relief I would recommend a CT scan of the lumbar spine.         Sarath Marquez MD    I spent time with the patient reviewing their imaging and discussing the risks benefits and alternatives to the above plan. A total of 30 minutes was spent reviewing the data and greater than 50% of that time was with the patient during the face to face encounter discussing treatment options both surgical, non-surgical, and  minimally invasive techniques.

## 2024-12-11 ENCOUNTER — APPOINTMENT (OUTPATIENT)
Dept: CARDIOLOGY | Facility: CLINIC | Age: 64
End: 2024-12-11
Payer: MEDICARE

## 2024-12-11 VITALS
BODY MASS INDEX: 33.64 KG/M2 | HEIGHT: 70 IN | OXYGEN SATURATION: 98 % | WEIGHT: 235 LBS | SYSTOLIC BLOOD PRESSURE: 140 MMHG | HEART RATE: 78 BPM | DIASTOLIC BLOOD PRESSURE: 96 MMHG

## 2024-12-11 DIAGNOSIS — I44.2 COMPLETE HEART BLOCK: Chronic | ICD-10-CM

## 2024-12-11 DIAGNOSIS — I10 BENIGN HYPERTENSION: Chronic | ICD-10-CM

## 2024-12-11 DIAGNOSIS — Z95.0 PACEMAKER: Primary | Chronic | ICD-10-CM

## 2024-12-11 PROCEDURE — 99213 OFFICE O/P EST LOW 20 MIN: CPT | Performed by: INTERNAL MEDICINE

## 2024-12-11 PROCEDURE — 3008F BODY MASS INDEX DOCD: CPT | Performed by: INTERNAL MEDICINE

## 2024-12-11 PROCEDURE — 3080F DIAST BP >= 90 MM HG: CPT | Performed by: INTERNAL MEDICINE

## 2024-12-11 PROCEDURE — 1036F TOBACCO NON-USER: CPT | Performed by: INTERNAL MEDICINE

## 2024-12-11 PROCEDURE — 3077F SYST BP >= 140 MM HG: CPT | Performed by: INTERNAL MEDICINE

## 2024-12-11 PROCEDURE — 1159F MED LIST DOCD IN RCRD: CPT | Performed by: INTERNAL MEDICINE

## 2024-12-11 RX ORDER — FUROSEMIDE 20 MG/1
20 TABLET ORAL DAILY
Qty: 30 TABLET | Refills: 11 | Status: SHIPPED | OUTPATIENT
Start: 2024-12-11 | End: 2025-12-11

## 2024-12-11 NOTE — LETTER
"December 11, 2024     Iggy Agarwal MD  [id number] [facility] [address]    Patient: Christopher Ralph   YOB: 1960   Date of Visit: 12/11/2024       Dear Dr. Iggy Agarwal MD:    Thank you for referring Christopher Ralph to me for evaluation. Below are my notes for this consultation.  If you have questions, please do not hesitate to call me. I look forward to following your patient along with you.       Sincerely,     James C Ramicone, DO      CC: No Recipients  ______________________________________________________________________________________    CARDIAC ELECTROPHYSIOLOGY PROGRESS NOTE    History Of Present Illness:      This is a 64-year-old male with a history of complete heart block.  He has a Medtronic dual-chamber permanent pacemaker.  He reports a slight increase in lower extremity edema and was placed on hydrochlorothiazide earlier this year.  His blood pressure has had a tendency to be elevated at times.  However, he reports having a lot of back pain which contributes to his elevated blood pressure readings.    Past medical history:    Hypertension  Complete heart block  GERD  Mild aortic valve stenosis  History of a pulmonary embolism  Ascending thoracic aortic aneurysm    Past surgical history:    Appendectomy  Medtronic pacemaker insertion January 2019    Review of Systems  Other review of systems negative     Last Recorded Vitals:      6/10/2022     9:25 AM 1/3/2024    10:27 AM 1/16/2024     1:08 PM 3/13/2024     2:18 PM 8/10/2024     5:49 PM 10/30/2024     8:56 AM 12/4/2024     8:45 AM   Vitals   Systolic 150 138 138 122  126 159   Diastolic 90 82 82 82  67 74   BP Location  Left arm  Right arm      Heart Rate 78 83  72 95 60 68   Temp     37.1 °C (98.7 °F) 36.5 °C (97.7 °F) 36.6 °C (97.9 °F)   Resp       15   Height 1.778 m (5' 10\")  1.778 m (5' 10\")   1.778 m (5' 10\") 1.778 m (5' 10\")   Weight (lb) 216 232 212 230 59.74 225 225   BMI 30.99 kg/m2 33.29 kg/m2 30.42 kg/m2 33 kg/m2 " 8.57 kg/m2 32.28 kg/m2 32.28 kg/m2   BSA (m2) 2.2 m2 2.28 m2 2.18 m2 2.27 m2 1.16 m2 2.24 m2 2.24 m2   Visit Report  Report  Report  Report Report     Allergies:  Patient has no known allergies.    Outpatient Medications:  Current Outpatient Medications   Medication Instructions   • amLODIPine (NORVASC) 10 mg, Daily   • aspirin 81 mg, Once   • gabapentin (NEURONTIN) 300 mg, oral, 2 times daily   • hydroCHLOROthiazide (MICROZIDE) 12.5 mg, oral, Daily   • losartan (COZAAR) 100 mg, Daily   • pantoprazole (PROTONIX) 40 mg, Daily       Physical Exam:    General Appearance:  Alert, oriented, no distress  Skin:  Warm and dry  Head and Neck:  No elevation of JVP, no carotid bruits  Cardiac Exam:  Rhythm is regular, S1 and S2 are normal, no murmur S3 or S4  Lungs:  Clear to auscultation  Extremities:  1+ edema  Neurologic:  No focal deficits  Psychiatric:  Appropriate mood and behavior    Lab Results:    CMP:  Recent Labs     02/21/24  1452 02/07/24  1551 08/10/23  1139 12/01/22  1415 10/16/21  1045 03/04/21  1145 01/28/19  0430 01/26/19  1640 01/26/19  1630    139 141 140 142 141 139 145 143   K 4.8 4.4 5.1 4.9 5.2 4.7 4.3 4.0 4.2    106 106 104 109* 106 108* 109* 110*   CO2 24 24 27 28 25 26 25 23 26   ANIONGAP 17 13 13 13 13 14 10 17 11   BUN 20 21 22 17 16 25* 21 29* 26*   CREATININE 1.18 1.19 1.01 1.05 1.02 1.16 0.94 1.20 1.22   EGFR 68 68  --   --   --   --   --   --   --    MG  --   --   --   --   --   --  2.10  --  2.12     Recent Labs     02/21/24  1452 08/10/23  1139 12/01/22  1415 10/16/21  1045 03/04/21  1145   ALBUMIN 4.2 4.2 4.3 4.1 4.7   ALKPHOS 105 106 84 80 84   ALT 19 15 16 14 17   AST 19 17 18 16 23   BILITOT 0.9 0.6 0.9 0.6 0.9     CBC:  Recent Labs     02/21/24  1452 08/10/23  1139 12/01/22  1415 10/16/21  1045 03/04/21  1145 01/28/19  0430 01/26/19  1630 10/17/18  1529   WBC 6.5 5.2 5.5 4.7 4.8 7.7 6.6 5.1   HGB 15.4 14.5 15.9 15.1 16.6 16.4 16.1 14.4   HCT 45.2 44.9 47.3 45.8 49.1 48.2 47.1  42.0    147* 199 142* 145* 158 164 124*   MCV 94 92 99 103* 98 101* 102* 101*     COAG:   Recent Labs     01/26/19  1630   INR 1.0     Cardiology Tests:  I have personally review the diagnostic cardiac testing and my interpretation is as follows:    Echocardiogram January 2024: Ejection fraction 50 to 55% with mild mitral valve regurgitation, ascending thoracic aorta 4.3 cm    Assessment/Plan  Problem List Items Addressed This Visit             ICD-10-CM    Benign hypertension (Chronic) I10     The repeat blood pressure today was 130/68.  Continue amlodipine at the same dosage.  Furosemide 20 mg daily will be added for lower extremity edema he will use this medication as needed.         Relevant Medications    furosemide (Lasix) 20 mg tablet    Complete heart block (Chronic) I44.2     History of a Medtronic dual-chamber pacemaker insertion January 28, 2019.  The device is functioning appropriately.  Continue follow-up as scheduled through the device clinic.         Pacemaker - Primary (Chronic) Z95.0     Medtronic Dorie XT  MRI implanted January 28, 2019:  RA and RV leads Medtronic 5076  Estimated battery longevity 5.5 years based on device interrogation November 6, 2024            James C Ramicone, DO

## 2024-12-11 NOTE — ASSESSMENT & PLAN NOTE
The repeat blood pressure today was 130/68.  Continue amlodipine at the same dosage.  Furosemide 20 mg daily will be added for lower extremity edema he will use this medication as needed.

## 2024-12-11 NOTE — ASSESSMENT & PLAN NOTE
History of a Medtronic dual-chamber pacemaker insertion January 28, 2019.  The device is functioning appropriately.  Continue follow-up as scheduled through the device clinic.

## 2024-12-11 NOTE — PROGRESS NOTES
"CARDIAC ELECTROPHYSIOLOGY PROGRESS NOTE    History Of Present Illness:      This is a 64-year-old male with a history of complete heart block.  He has a Medtronic dual-chamber permanent pacemaker.  He reports a slight increase in lower extremity edema and was placed on hydrochlorothiazide earlier this year.  His blood pressure has had a tendency to be elevated at times.  However, he reports having a lot of back pain which contributes to his elevated blood pressure readings.    Past medical history:    Hypertension  Complete heart block  GERD  Mild aortic valve stenosis  History of a pulmonary embolism  Ascending thoracic aortic aneurysm    Past surgical history:    Appendectomy  Medtronic pacemaker insertion January 2019    Review of Systems  Other review of systems negative     Last Recorded Vitals:      6/10/2022     9:25 AM 1/3/2024    10:27 AM 1/16/2024     1:08 PM 3/13/2024     2:18 PM 8/10/2024     5:49 PM 10/30/2024     8:56 AM 12/4/2024     8:45 AM   Vitals   Systolic 150 138 138 122  126 159   Diastolic 90 82 82 82  67 74   BP Location  Left arm  Right arm      Heart Rate 78 83  72 95 60 68   Temp     37.1 °C (98.7 °F) 36.5 °C (97.7 °F) 36.6 °C (97.9 °F)   Resp       15   Height 1.778 m (5' 10\")  1.778 m (5' 10\")   1.778 m (5' 10\") 1.778 m (5' 10\")   Weight (lb) 216 232 212 230 59.74 225 225   BMI 30.99 kg/m2 33.29 kg/m2 30.42 kg/m2 33 kg/m2 8.57 kg/m2 32.28 kg/m2 32.28 kg/m2   BSA (m2) 2.2 m2 2.28 m2 2.18 m2 2.27 m2 1.16 m2 2.24 m2 2.24 m2   Visit Report  Report  Report  Report Report     Allergies:  Patient has no known allergies.    Outpatient Medications:  Current Outpatient Medications   Medication Instructions    amLODIPine (NORVASC) 10 mg, Daily    aspirin 81 mg, Once    gabapentin (NEURONTIN) 300 mg, oral, 2 times daily    hydroCHLOROthiazide (MICROZIDE) 12.5 mg, oral, Daily    losartan (COZAAR) 100 mg, Daily    pantoprazole (PROTONIX) 40 mg, Daily       Physical Exam:    General Appearance:  Alert, " oriented, no distress  Skin:  Warm and dry  Head and Neck:  No elevation of JVP, no carotid bruits  Cardiac Exam:  Rhythm is regular, S1 and S2 are normal, no murmur S3 or S4  Lungs:  Clear to auscultation  Extremities:  1+ edema  Neurologic:  No focal deficits  Psychiatric:  Appropriate mood and behavior    Lab Results:    CMP:  Recent Labs     02/21/24  1452 02/07/24  1551 08/10/23  1139 12/01/22  1415 10/16/21  1045 03/04/21  1145 01/28/19  0430 01/26/19  1640 01/26/19  1630    139 141 140 142 141 139 145 143   K 4.8 4.4 5.1 4.9 5.2 4.7 4.3 4.0 4.2    106 106 104 109* 106 108* 109* 110*   CO2 24 24 27 28 25 26 25 23 26   ANIONGAP 17 13 13 13 13 14 10 17 11   BUN 20 21 22 17 16 25* 21 29* 26*   CREATININE 1.18 1.19 1.01 1.05 1.02 1.16 0.94 1.20 1.22   EGFR 68 68  --   --   --   --   --   --   --    MG  --   --   --   --   --   --  2.10  --  2.12     Recent Labs     02/21/24  1452 08/10/23  1139 12/01/22  1415 10/16/21  1045 03/04/21  1145   ALBUMIN 4.2 4.2 4.3 4.1 4.7   ALKPHOS 105 106 84 80 84   ALT 19 15 16 14 17   AST 19 17 18 16 23   BILITOT 0.9 0.6 0.9 0.6 0.9     CBC:  Recent Labs     02/21/24  1452 08/10/23  1139 12/01/22  1415 10/16/21  1045 03/04/21  1145 01/28/19  0430 01/26/19  1630 10/17/18  1529   WBC 6.5 5.2 5.5 4.7 4.8 7.7 6.6 5.1   HGB 15.4 14.5 15.9 15.1 16.6 16.4 16.1 14.4   HCT 45.2 44.9 47.3 45.8 49.1 48.2 47.1 42.0    147* 199 142* 145* 158 164 124*   MCV 94 92 99 103* 98 101* 102* 101*     COAG:   Recent Labs     01/26/19  1630   INR 1.0     Cardiology Tests:  I have personally review the diagnostic cardiac testing and my interpretation is as follows:    Echocardiogram January 2024: Ejection fraction 50 to 55% with mild mitral valve regurgitation, ascending thoracic aorta 4.3 cm    Assessment/Plan   Problem List Items Addressed This Visit             ICD-10-CM    Benign hypertension (Chronic) I10     The repeat blood pressure today was 130/68.  Continue amlodipine at the  same dosage.  Furosemide 20 mg daily will be added for lower extremity edema he will use this medication as needed.         Relevant Medications    furosemide (Lasix) 20 mg tablet    Complete heart block (Chronic) I44.2     History of a Medtronic dual-chamber pacemaker insertion January 28, 2019.  The device is functioning appropriately.  Continue follow-up as scheduled through the device clinic.         Pacemaker - Primary (Chronic) Z95.0     Medtronic [place] XT  MRI implanted January 28, 2019:  RA and RV leads Medtronic [id number]  Estimated battery longevity 5.5 years based on device interrogation November 6, 2024            James C Ramicone, DO

## 2024-12-11 NOTE — ASSESSMENT & PLAN NOTE
Medtronic [place] XT  MRI implanted January 28, 2019:  RA and RV leads Medtronic [id number]  Estimated battery longevity 5.5 years based on device interrogation November 6, 2024

## 2024-12-15 DIAGNOSIS — I10 BENIGN HYPERTENSION: Chronic | ICD-10-CM

## 2024-12-17 RX ORDER — HYDROCHLOROTHIAZIDE 12.5 MG/1
12.5 TABLET ORAL DAILY
Qty: 90 TABLET | Refills: 3 | Status: SHIPPED | OUTPATIENT
Start: 2024-12-17

## 2025-01-02 ENCOUNTER — APPOINTMENT (OUTPATIENT)
Dept: PAIN MEDICINE | Facility: CLINIC | Age: 65
End: 2025-01-02
Payer: MEDICARE

## 2025-01-15 ENCOUNTER — APPOINTMENT (OUTPATIENT)
Dept: CARDIOLOGY | Facility: CLINIC | Age: 65
End: 2025-01-15
Payer: MEDICARE

## 2025-01-24 ENCOUNTER — HOSPITAL ENCOUNTER (OUTPATIENT)
Dept: PAIN MEDICINE | Facility: CLINIC | Age: 65
Discharge: HOME | End: 2025-01-24
Payer: MEDICARE

## 2025-01-24 VITALS
SYSTOLIC BLOOD PRESSURE: 177 MMHG | OXYGEN SATURATION: 98 % | DIASTOLIC BLOOD PRESSURE: 91 MMHG | BODY MASS INDEX: 32.93 KG/M2 | RESPIRATION RATE: 15 BRPM | HEIGHT: 70 IN | TEMPERATURE: 97 F | WEIGHT: 230 LBS | HEART RATE: 85 BPM

## 2025-01-24 DIAGNOSIS — M54.16 LUMBAR NEURITIS: ICD-10-CM

## 2025-01-24 PROCEDURE — 2500000005 HC RX 250 GENERAL PHARMACY W/O HCPCS: Performed by: ANESTHESIOLOGY

## 2025-01-24 PROCEDURE — 62323 NJX INTERLAMINAR LMBR/SAC: CPT | Performed by: ANESTHESIOLOGY

## 2025-01-24 PROCEDURE — 2500000004 HC RX 250 GENERAL PHARMACY W/ HCPCS (ALT 636 FOR OP/ED): Performed by: ANESTHESIOLOGY

## 2025-01-24 RX ORDER — DICLOFENAC SODIUM 75 MG/1
TABLET, DELAYED RELEASE ORAL
COMMUNITY
Start: 2024-11-24

## 2025-01-24 RX ORDER — SODIUM CHLORIDE 9 MG/ML
INJECTION, SOLUTION INTRAMUSCULAR; INTRAVENOUS; SUBCUTANEOUS AS NEEDED
Status: COMPLETED | OUTPATIENT
Start: 2025-01-24 | End: 2025-01-24

## 2025-01-24 RX ORDER — LIDOCAINE HYDROCHLORIDE 5 MG/ML
INJECTION, SOLUTION INFILTRATION; INTRAVENOUS AS NEEDED
Status: COMPLETED | OUTPATIENT
Start: 2025-01-24 | End: 2025-01-24

## 2025-01-24 RX ORDER — TRIAMCINOLONE ACETONIDE 40 MG/ML
INJECTION, SUSPENSION INTRA-ARTICULAR; INTRAMUSCULAR AS NEEDED
Status: COMPLETED | OUTPATIENT
Start: 2025-01-24 | End: 2025-01-24

## 2025-01-24 RX ADMIN — LIDOCAINE HYDROCHLORIDE 5 ML: 5 INJECTION, SOLUTION INFILTRATION at 14:07

## 2025-01-24 RX ADMIN — TRIAMCINOLONE ACETONIDE 40 MG: 40 INJECTION, SUSPENSION INTRA-ARTICULAR; INTRAMUSCULAR at 14:07

## 2025-01-24 RX ADMIN — SODIUM CHLORIDE 5 ML: 9 INJECTION, SOLUTION INTRAMUSCULAR; INTRAVENOUS; SUBCUTANEOUS at 14:07

## 2025-01-24 SDOH — ECONOMIC STABILITY: FOOD INSECURITY: WITHIN THE PAST 12 MONTHS, YOU WORRIED THAT YOUR FOOD WOULD RUN OUT BEFORE YOU GOT MONEY TO BUY MORE.: NEVER TRUE

## 2025-01-24 SDOH — ECONOMIC STABILITY: FOOD INSECURITY: WITHIN THE PAST 12 MONTHS, THE FOOD YOU BOUGHT JUST DIDN'T LAST AND YOU DIDN'T HAVE MONEY TO GET MORE.: NEVER TRUE

## 2025-01-24 ASSESSMENT — ENCOUNTER SYMPTOMS
NECK STIFFNESS: 0
SEIZURES: 0
WHEEZING: 0
DEPRESSION: 0
CHEST TIGHTNESS: 0
FEVER: 0
LOSS OF SENSATION IN FEET: 0
SPEECH DIFFICULTY: 0
COUGH: 0
BACK PAIN: 1
CONSTIPATION: 0
SHORTNESS OF BREATH: 0
NUMBNESS: 1
NAUSEA: 0
ARTHRALGIAS: 1
BLOOD IN STOOL: 0
CHILLS: 0
DIFFICULTY URINATING: 0
DIZZINESS: 0
DIARRHEA: 0
NECK PAIN: 0
WEAKNESS: 0
VOMITING: 0
DIAPHORESIS: 0
EYE DISCHARGE: 0
OCCASIONAL FEELINGS OF UNSTEADINESS: 1

## 2025-01-24 ASSESSMENT — PAIN - FUNCTIONAL ASSESSMENT: PAIN_FUNCTIONAL_ASSESSMENT: 0-10

## 2025-01-24 ASSESSMENT — LIFESTYLE VARIABLES
HOW MANY STANDARD DRINKS CONTAINING ALCOHOL DO YOU HAVE ON A TYPICAL DAY: 1 OR 2
AUDIT-C TOTAL SCORE: 4
HOW OFTEN DO YOU HAVE SIX OR MORE DRINKS ON ONE OCCASION: NEVER
HOW OFTEN DO YOU HAVE A DRINK CONTAINING ALCOHOL: 4 OR MORE TIMES A WEEK
SKIP TO QUESTIONS 9-10: 1

## 2025-01-24 ASSESSMENT — PAIN SCALES - GENERAL
PAINLEVEL_OUTOF10: 4
PAINLEVEL_OUTOF10: 6

## 2025-01-24 ASSESSMENT — PAIN DESCRIPTION - DESCRIPTORS: DESCRIPTORS: ACHING;PRESSURE

## 2025-01-24 NOTE — H&P
History Of Present Illness  Christopher Ralph is a 64 y.o. male presenting with lumbar neuritis.     Past Medical History  Past Medical History:   Diagnosis Date    Aortic stenosis 12/21/2023    mild    Bilateral leg edema 01/03/2024    Complete heart block 12/21/2023    Left bundle branch block (LBBB) 12/21/2023    Overweight     Overweight (BMI 25.0-29.9)    Pacemaker 12/21/2023    Medtronic … implanted January 2019 for complete heart block    Personal history of other diseases of the digestive system     History of gastroesophageal reflux (GERD)    Personal history of other specified conditions     History of shortness of breath    Pulmonary embolism without acute cor pulmonale 12/21/2023    Found incidentally on CAT scan ordered by Pulmonary    Thoracic aortic aneurysm (CMS-HCC) 12/21/2023    4 cm     Thoracic aortic aneurysm, without rupture, unspecified (CMS-HCC) 08/26/2021    Thoracic aortic aneurysm       Surgical History  Past Surgical History:   Procedure Laterality Date    APPENDECTOMY  10/02/2017    Appendectomy    OTHER SURGICAL HISTORY  08/12/2021    Pacemaker insertion        Social History  He reports that he quit smoking about 4 years ago. His smoking use included cigarettes. He started smoking about 34 years ago. He has a 7.5 pack-year smoking history. He has been exposed to tobacco smoke. He has never used smokeless tobacco. He reports current alcohol use. He reports that he does not use drugs.    Family History  No family history on file.     Allergies  Patient has no known allergies.    Review of Systems   Constitutional:  Negative for chills, diaphoresis and fever.   HENT:  Negative for ear discharge and tinnitus.    Eyes:  Negative for discharge.   Respiratory:  Negative for cough, chest tightness, shortness of breath and wheezing.    Cardiovascular:  Negative for chest pain.   Gastrointestinal:  Negative for blood in stool, constipation, diarrhea, nausea and vomiting.   Endocrine: Negative for  "polyuria.   Genitourinary:  Negative for difficulty urinating.   Musculoskeletal:  Positive for arthralgias, back pain and gait problem. Negative for neck pain and neck stiffness.   Skin:  Negative for rash.   Neurological:  Positive for numbness. Negative for dizziness, seizures, speech difficulty and weakness.        Physical Exam  Constitutional:       Appearance: Normal appearance.   HENT:      Head: Normocephalic.      Nose: Nose normal.      Mouth/Throat:      Mouth: Mucous membranes are moist.      Pharynx: Oropharynx is clear.   Eyes:      Extraocular Movements: Extraocular movements intact.      Conjunctiva/sclera: Conjunctivae normal.      Pupils: Pupils are equal, round, and reactive to light.   Cardiovascular:      Rate and Rhythm: Normal rate.   Pulmonary:      Effort: Pulmonary effort is normal. No respiratory distress.      Breath sounds: No wheezing.   Chest:      Chest wall: No tenderness.   Abdominal:      Palpations: Abdomen is soft.   Musculoskeletal:      Cervical back: No rigidity.   Skin:     General: Skin is warm and dry.      Findings: No rash.   Neurological:      Mental Status: He is alert and oriented to person, place, and time.      Sensory: Sensory deficit present.      Motor: Weakness present.      Gait: Gait abnormal.   Psychiatric:         Mood and Affect: Mood normal.         Behavior: Behavior normal.          Last Recorded Vitals  Blood pressure (!) 177/91, pulse 74, temperature 36.1 °C (97 °F), resp. rate 18, height 1.778 m (5' 10\"), weight 104 kg (230 lb), SpO2 98%.       Assessment/Plan   1. Lumbar neuritis  Epidural Steroid Injection    Epidural Steroid Injection    FL pain management    FL pain management           Sarath Marquez MD    "

## 2025-02-12 ENCOUNTER — HOSPITAL ENCOUNTER (OUTPATIENT)
Dept: CARDIOLOGY | Facility: CLINIC | Age: 65
Discharge: HOME | End: 2025-02-12
Payer: MEDICARE

## 2025-02-12 DIAGNOSIS — I44.2 COMPLETE HEART BLOCK: ICD-10-CM

## 2025-02-12 DIAGNOSIS — Z95.0 PRESENCE OF CARDIAC PACEMAKER: ICD-10-CM

## 2025-02-12 PROCEDURE — 93296 REM INTERROG EVL PM/IDS: CPT

## 2025-02-12 PROCEDURE — 93294 REM INTERROG EVL PM/LDLS PM: CPT | Performed by: INTERNAL MEDICINE

## 2025-02-18 PROBLEM — M25.551 PAIN OF RIGHT HIP: Status: RESOLVED | Noted: 2024-08-20 | Resolved: 2025-02-18

## 2025-02-18 PROBLEM — M54.41 ACUTE RIGHT-SIDED LOW BACK PAIN WITH RIGHT-SIDED SCIATICA: Status: RESOLVED | Noted: 2024-01-10 | Resolved: 2025-02-18

## 2025-02-18 NOTE — PROGRESS NOTES
Referred by No ref. provider found    HPI I am seeing Terrance for a yearly visit. Doing great except his back. Wt up 6#.  No CP/SOB/Palp.     Past Medical History:  Problem List Items Addressed This Visit    None     Past Medical History:   Diagnosis Date    Aortic stenosis 12/21/2023    mild    Bilateral leg edema 01/03/2024    Complete heart block 12/21/2023    Left bundle branch block (LBBB) 12/21/2023    Overweight     Overweight (BMI 25.0-29.9)    Pacemaker 12/21/2023    Medtronic … implanted January 2019 for complete heart block    Personal history of other diseases of the digestive system     History of gastroesophageal reflux (GERD)    Personal history of other specified conditions     History of shortness of breath    Pulmonary embolism without acute cor pulmonale 12/21/2023    Found incidentally on CAT scan ordered by Pulmonary    Thoracic aortic aneurysm (CMS-HCC) 12/21/2023    4 cm     Thoracic aortic aneurysm, without rupture, unspecified (CMS-HCC) 08/26/2021    Thoracic aortic aneurysm      Past Surgical History:  He has a past surgical history that includes Appendectomy (10/02/2017) and Other surgical history (08/12/2021).      Social History:  He reports that he quit smoking about 4 years ago. His smoking use included cigarettes. He started smoking about 34 years ago. He has a 7.5 pack-year smoking history. He has been exposed to tobacco smoke. He has never used smokeless tobacco. He reports current alcohol use. He reports that he does not use drugs.    Family History:  No family history on file.     Allergies:  Patient has no known allergies.    Outpatient Medications:  Current Outpatient Medications   Medication Instructions    amLODIPine (NORVASC) 10 mg, Daily    aspirin 81 mg, Once    diclofenac (Voltaren) 75 mg EC tablet PLEASE SEE ATTACHED FOR DETAILED DIRECTIONS    furosemide (LASIX) 20 mg, oral, Daily    gabapentin (NEURONTIN) 300 mg, oral, 2 times daily    hydroCHLOROthiazide (MICROZIDE) 12.5  mg, oral, Daily    losartan (COZAAR) 100 mg, Daily    pantoprazole (PROTONIX) 40 mg, Daily     Last Recorded Vitals:  There were no vitals filed for this visit.    Physical Exam  Patient is alert and oriented x3.  HEENT is unremarkable mucous members are moist  Neck no JVP no bruits upstrokes are full no thyromegaly  Lungs are clear bilaterally.  No wheezing crackles or rales  Heart regular rhythm normal S1-S2 there is no S3 no murmurs are heard.  Abdomen is soft bs are positive nontender nondistended no organomegaly no pulsatile masses  Extremities have 1+ edema.  Distal pulses present palpable.  Neuro is grossly nonfocal  Skin has no rashes     Last Labs:  CBC -  Lab Results   Component Value Date    WBC 6.5 02/21/2024    HGB 15.4 02/21/2024    HCT 45.2 02/21/2024    MCV 94 02/21/2024     02/21/2024     CMP -  Lab Results   Component Value Date    CALCIUM 9.6 02/21/2024    PHOS 3.3 01/26/2019    PROT 6.9 02/21/2024    ALBUMIN 4.2 02/21/2024    AST 19 02/21/2024    ALT 19 02/21/2024    ALKPHOS 105 02/21/2024    BILITOT 0.9 02/21/2024     LIPID PANEL -   Lab Results   Component Value Date    CHOL 184 02/21/2024    HDL 91.8 02/21/2024    CHHDL 2.0 02/21/2024    VLDL 13 02/21/2024    TRIG 65 02/21/2024    NHDL 92 02/21/2024     RENAL FUNCTION PANEL -   Lab Results   Component Value Date    K 4.8 02/21/2024    PHOS 3.3 01/26/2019       Lab Results   Component Value Date     (H) 01/26/2019     Procedure    TTE  1/16/24 EF 50-55%, DDF, LAE, Mild MR, TAA 4.3 cm    Echo 9/14/2021 EF 50%, DDF, mild pulm hypertension, thoracic aorta 4.1 cm    VENOUS U/S [03/08/2019]: Negative for DVT     CTA (03/07/2019): Pulm embolism w/in subsegmental branch of R-lower lobe pulm. artery, New from (1/26/19) New subsegmental 3-in-bud ground glass opacities in L-lung suspicious for developing viral/atypical pneumonia. Status post L-chest wall pacemaker/AICD w/out definitive evidence Pulm arterial pressure. Stable nonspecific  mediastinal/hilar adenopathy.     ECHO (01/28/2019): Normal LVF 55-60% EF. Mildly elev RVSP. AR measures 3.7 cm. Arterial septal aneurysm prsent.     CTA [01/26/2019]: Aorta 3.9 cm, persistent mediastinal lymphadenopathy     ECHO [09/2017]: EF 50%, aortic sclerosis, aorta 4.2 cm     ECHO [01/17/2014]: Normal left ventricular size with concentric left ventricular hypertrophy and normal left ventricular systolic function. Estimated left ventricular ejection fraction is 60%. Mild left atrial enlargement. Mild aortic stenosis with a trileaflet aortic valve with trace regurgitation. Dilated ascending aorta, measuring 4.3 centimeters. Mild pulmonary hypertension.     EX NST [09/26/2014]: 10 min 56 sec (13.2 METs) ... Normal, EF 54%          Assessment/Plan   1. Complete heart block. Left bundle branch block. Status post pacemaker placement 2019. Doing well. Followed by Dr. Ramicone.     2. Thoracic aortic aneurysm. 3.9 cm on the CT scan 4.2 cm on echo.  His repeat echo 2021 September revealed a 4 cm aneurysm. TTE  1/16/24 EF 50-55%, DDF, LAE, Mild MR, TAA 4.3 cm. Repeat soon    3. Aortic stenosis. Mostly sclerosis. Not noted on his echo 1/2024.      4. Hyperlipidemia. Followed by Dr. Agarwal.  8/10/2023 LDL 68  triglycerides 68     5. Hypertension. BP excellent despite 6# wt gain.      6. Pulmonary embolism. This was discovered incidentally in March 2019 on a CT scan ordered by pulmonary medicine.  Currently off of Xarelto per pulmonary     EKG today. Echo in the near future ( he should call 1 wk after to discuss results), RTC 1 year.     Nikos Tripathi MD     Instructions and follow up

## 2025-02-19 ENCOUNTER — OFFICE VISIT (OUTPATIENT)
Dept: CARDIOLOGY | Facility: CLINIC | Age: 65
End: 2025-02-19
Payer: MEDICARE

## 2025-02-19 ENCOUNTER — APPOINTMENT (OUTPATIENT)
Dept: CARDIOLOGY | Facility: CLINIC | Age: 65
End: 2025-02-19
Payer: MEDICARE

## 2025-02-19 VITALS
HEART RATE: 74 BPM | WEIGHT: 238 LBS | DIASTOLIC BLOOD PRESSURE: 76 MMHG | BODY MASS INDEX: 34.15 KG/M2 | SYSTOLIC BLOOD PRESSURE: 118 MMHG | OXYGEN SATURATION: 98 %

## 2025-02-19 DIAGNOSIS — I44.2 COMPLETE HEART BLOCK: Chronic | ICD-10-CM

## 2025-02-19 DIAGNOSIS — I10 BENIGN HYPERTENSION: Chronic | ICD-10-CM

## 2025-02-19 DIAGNOSIS — I44.7 LEFT BUNDLE BRANCH BLOCK (LBBB): Chronic | ICD-10-CM

## 2025-02-19 DIAGNOSIS — I71.21 ANEURYSM OF ASCENDING AORTA WITHOUT RUPTURE (CMS-HCC): Chronic | ICD-10-CM

## 2025-02-19 DIAGNOSIS — I35.0 NONRHEUMATIC AORTIC VALVE STENOSIS: Primary | Chronic | ICD-10-CM

## 2025-02-19 PROBLEM — E66.811 CLASS 1 OBESITY WITH BODY MASS INDEX (BMI) OF 33.0 TO 33.9 IN ADULT: Status: ACTIVE | Noted: 2025-02-19

## 2025-02-19 PROCEDURE — 3074F SYST BP LT 130 MM HG: CPT | Performed by: INTERNAL MEDICINE

## 2025-02-19 PROCEDURE — 99214 OFFICE O/P EST MOD 30 MIN: CPT | Performed by: INTERNAL MEDICINE

## 2025-02-19 PROCEDURE — 93000 ELECTROCARDIOGRAM COMPLETE: CPT | Performed by: INTERNAL MEDICINE

## 2025-02-19 PROCEDURE — 3078F DIAST BP <80 MM HG: CPT | Performed by: INTERNAL MEDICINE

## 2025-02-19 PROCEDURE — 1160F RVW MEDS BY RX/DR IN RCRD: CPT | Performed by: INTERNAL MEDICINE

## 2025-02-19 PROCEDURE — 1159F MED LIST DOCD IN RCRD: CPT | Performed by: INTERNAL MEDICINE

## 2025-02-19 PROCEDURE — 1036F TOBACCO NON-USER: CPT | Performed by: INTERNAL MEDICINE

## 2025-02-19 NOTE — PATIENT INSTRUCTIONS
1. Complete heart block. Left bundle branch block. Status post pacemaker placement 2019. Doing well. Followed by Dr. Ramicone.     2. Thoracic aortic aneurysm. 3.9 cm on the CT scan 4.2 cm on echo.  His repeat echo 2021 September revealed a 4 cm aneurysm. TTE  1/16/24 EF 50-55%, DDF, LAE, Mild MR, TAA 4.3 cm. Repeat soon    3. Aortic stenosis. Mostly sclerosis. Not noted on his echo 1/2024.      4. Hyperlipidemia. Followed by Dr. Agarwal.  8/10/2023 LDL 68  triglycerides 68     5. Hypertension. BP excellent despite 6# wt gain.      6. Pulmonary embolism. This was discovered incidentally in March 2019 on a CT scan ordered by pulmonary medicine.  Currently off of Xarelto per pulmonary     EKG today. Echo in the near future ( he should call 1 wk after to discuss results), RTC 1 year.

## 2025-02-26 ENCOUNTER — HOSPITAL ENCOUNTER (OUTPATIENT)
Dept: CARDIOLOGY | Facility: CLINIC | Age: 65
Discharge: HOME | End: 2025-02-26
Payer: MEDICARE

## 2025-02-26 DIAGNOSIS — R60.1 GENERALIZED EDEMA: ICD-10-CM

## 2025-02-26 DIAGNOSIS — I35.0 NONRHEUMATIC AORTIC VALVE STENOSIS: Chronic | ICD-10-CM

## 2025-02-26 DIAGNOSIS — I26.99 OTHER PULMONARY EMBOLISM WITHOUT ACUTE COR PULMONALE: ICD-10-CM

## 2025-02-26 DIAGNOSIS — I44.7 LEFT BUNDLE-BRANCH BLOCK, UNSPECIFIED: ICD-10-CM

## 2025-02-26 DIAGNOSIS — I71.21 ANEURYSM OF ASCENDING AORTA WITHOUT RUPTURE (CMS-HCC): Chronic | ICD-10-CM

## 2025-02-26 LAB
AORTIC VALVE MEAN GRADIENT: 4 MMHG
AORTIC VALVE PEAK VELOCITY: 1.47 M/S
AV PEAK GRADIENT: 9 MMHG
AVA (PEAK VEL): 2.54 CM2
AVA (VTI): 2.33 CM2
EJECTION FRACTION APICAL 4 CHAMBER: 40.6
EJECTION FRACTION: 43 %
LEFT ATRIUM VOLUME AREA LENGTH INDEX BSA: 35.4 ML/M2
LEFT VENTRICLE INTERNAL DIMENSION DIASTOLE: 6.28 CM (ref 3.5–6)
LEFT VENTRICULAR OUTFLOW TRACT DIAMETER: 2.3 CM
MITRAL VALVE E/A RATIO: 0.85
RIGHT VENTRICLE FREE WALL PEAK S': 0.13 CM/S
RIGHT VENTRICLE PEAK SYSTOLIC PRESSURE: 25.5 MMHG
TRICUSPID ANNULAR PLANE SYSTOLIC EXCURSION: 2 CM

## 2025-02-26 PROCEDURE — 2500000004 HC RX 250 GENERAL PHARMACY W/ HCPCS (ALT 636 FOR OP/ED): Performed by: INTERNAL MEDICINE

## 2025-02-26 PROCEDURE — 93306 TTE W/DOPPLER COMPLETE: CPT | Performed by: INTERNAL MEDICINE

## 2025-02-26 PROCEDURE — C8929 TTE W OR WO FOL WCON,DOPPLER: HCPCS

## 2025-02-26 RX ADMIN — PERFLUTREN 2 ML OF DILUTION: 6.52 INJECTION, SUSPENSION INTRAVENOUS at 08:45

## 2025-04-09 ENCOUNTER — OFFICE VISIT (OUTPATIENT)
Dept: CARDIOLOGY | Facility: CLINIC | Age: 65
End: 2025-04-09
Payer: MEDICARE

## 2025-04-09 VITALS
DIASTOLIC BLOOD PRESSURE: 84 MMHG | BODY MASS INDEX: 34.29 KG/M2 | WEIGHT: 239 LBS | OXYGEN SATURATION: 97 % | HEART RATE: 86 BPM | SYSTOLIC BLOOD PRESSURE: 126 MMHG

## 2025-04-09 DIAGNOSIS — I44.7 LEFT BUNDLE BRANCH BLOCK (LBBB): Chronic | ICD-10-CM

## 2025-04-09 DIAGNOSIS — I71.21 ANEURYSM OF ASCENDING AORTA WITHOUT RUPTURE: Chronic | ICD-10-CM

## 2025-04-09 DIAGNOSIS — Z95.0 PACEMAKER: Chronic | ICD-10-CM

## 2025-04-09 DIAGNOSIS — I25.5 ISCHEMIC CARDIOMYOPATHY: ICD-10-CM

## 2025-04-09 DIAGNOSIS — I10 BENIGN HYPERTENSION: Primary | Chronic | ICD-10-CM

## 2025-04-09 DIAGNOSIS — I42.0 DILATED CARDIOMYOPATHY (MULTI): Chronic | ICD-10-CM

## 2025-04-09 DIAGNOSIS — I44.2 COMPLETE HEART BLOCK: Chronic | ICD-10-CM

## 2025-04-09 PROCEDURE — 3079F DIAST BP 80-89 MM HG: CPT | Performed by: INTERNAL MEDICINE

## 2025-04-09 PROCEDURE — 1036F TOBACCO NON-USER: CPT | Performed by: INTERNAL MEDICINE

## 2025-04-09 PROCEDURE — 99214 OFFICE O/P EST MOD 30 MIN: CPT | Performed by: INTERNAL MEDICINE

## 2025-04-09 PROCEDURE — 1159F MED LIST DOCD IN RCRD: CPT | Performed by: INTERNAL MEDICINE

## 2025-04-09 PROCEDURE — 1160F RVW MEDS BY RX/DR IN RCRD: CPT | Performed by: INTERNAL MEDICINE

## 2025-04-09 PROCEDURE — 3074F SYST BP LT 130 MM HG: CPT | Performed by: INTERNAL MEDICINE

## 2025-04-09 NOTE — PATIENT INSTRUCTIONS
1. Complete heart block. Left bundle branch block. Status post pacemaker placement 2019. Doing well. Followed by Dr. Ramicone.     2. Thoracic aortic aneurysm. 3.9 cm on the CT scan 4.2 cm on echo.  His repeat echo 2021 September revealed a 4 cm aneurysm. TTE 2/26/2025 EF 40 to 45%, DD EF, LVH, LAE, mild to moderate MR, TAA 4.3 cm    3. Aortic stenosis. Mostly sclerosis. Not noted on his echo 1/2024.      4. Hyperlipidemia. Followed by Dr. Agarwal.  8/10/2023 LDL 68  triglycerides 68     5. Hypertension. BP excellent despite 6# wt gain.      6. Pulmonary embolism. This was discovered incidentally in March 2019 on a CT scan ordered by pulmonary medicine.  Currently off of Xarelto per pulmonary    7.  Cardiomyopathy.  Ejection fraction is not 40 to 45%.  Etiology unclear.  This may be related to RV pacing, for possibly his left bundle branch block.  I have asked for him to have an attempt at an exercise nuclear stress test.  We may not be able to do this because of his back and may need to be regadenoson.    RTC 4 months stress test sometime in the next couple months

## 2025-04-09 NOTE — PROGRESS NOTES
Referred by Any MEADOWS I am seeing Terrance for a 2-month follow-up.  When I seen him in February was a yearly follow-up.  He was feeling okay.  We did an echo to evaluate LV function, mitral regurgitation, and his thoracic aortic aneurysm.  His aneurysm was stable at 4.3 cm.  He had mild to moderate mitral regurgitation.  His ejection fraction however dropped from 50% down to 40 to 45%.    Past Medical History:  Problem List Items Addressed This Visit    None     Past Medical History:   Diagnosis Date    Aortic stenosis 12/21/2023    mild    Bilateral leg edema 01/03/2024    Complete heart block 12/21/2023    Left bundle branch block (LBBB) 12/21/2023    Overweight     Overweight (BMI 25.0-29.9)    Pacemaker 12/21/2023    Medtronic … implanted January 2019 for complete heart block    Personal history of other diseases of the digestive system     History of gastroesophageal reflux (GERD)    Personal history of other specified conditions     History of shortness of breath    Pulmonary embolism without acute cor pulmonale 12/21/2023    Found incidentally on CAT scan ordered by Pulmonary    Thoracic aortic aneurysm 12/21/2023    4 cm     Thoracic aortic aneurysm, without rupture, unspecified 08/26/2021    Thoracic aortic aneurysm      Past Surgical History:  He has a past surgical history that includes Appendectomy (10/02/2017) and Other surgical history (08/12/2021).      Social History:  He reports that he quit smoking about 4 years ago. His smoking use included cigarettes. He started smoking about 34 years ago. He has a 7.5 pack-year smoking history. He has been exposed to tobacco smoke. He has never used smokeless tobacco. He reports current alcohol use. He reports that he does not use drugs.    Family History:  No family history on file.     Allergies:  Patient has no known allergies.    Outpatient Medications:  Current Outpatient Medications   Medication Instructions    amLODIPine (NORVASC) 10 mg, Daily    aspirin 81  mg, Once    diclofenac (Voltaren) 75 mg EC tablet PLEASE SEE ATTACHED FOR DETAILED DIRECTIONS    furosemide (LASIX) 20 mg, oral, Daily    gabapentin (NEURONTIN) 300 mg, oral, 2 times daily    hydroCHLOROthiazide (MICROZIDE) 12.5 mg, oral, Daily    losartan (COZAAR) 100 mg, Daily    pantoprazole (PROTONIX) 40 mg, Daily     Last Recorded Vitals:  Vitals:    04/09/25 1540   BP: 126/84   BP Location: Left arm   Patient Position: Sitting   Pulse: 86   SpO2: 97%   Weight: 108 kg (239 lb)       Physical Exam  Patient is alert and oriented x3.  HEENT is unremarkable mucous members are moist  Neck no JVP no bruits upstrokes are full no thyromegaly  Lungs are clear bilaterally.  No wheezing crackles or rales  Heart regular rhythm normal S1-S2 there is no S3 no murmurs are heard.  Abdomen is soft bs are positive nontender nondistended no organomegaly no pulsatile masses  Extremities have 1+ edema.  Distal pulses present palpable.  Neuro is grossly nonfocal  Skin has no rashes     Last Labs:  CBC -  Lab Results   Component Value Date    WBC 6.5 02/21/2024    HGB 15.4 02/21/2024    HCT 45.2 02/21/2024    MCV 94 02/21/2024     02/21/2024     CMP -  Lab Results   Component Value Date    CALCIUM 9.6 02/21/2024    PHOS 3.3 01/26/2019    PROT 6.9 02/21/2024    ALBUMIN 4.2 02/21/2024    AST 19 02/21/2024    ALT 19 02/21/2024    ALKPHOS 105 02/21/2024    BILITOT 0.9 02/21/2024     LIPID PANEL -   Lab Results   Component Value Date    CHOL 184 02/21/2024    HDL 91.8 02/21/2024    CHHDL 2.0 02/21/2024    VLDL 13 02/21/2024    TRIG 65 02/21/2024    NHDL 92 02/21/2024     RENAL FUNCTION PANEL -   Lab Results   Component Value Date    K 4.8 02/21/2024    PHOS 3.3 01/26/2019       Lab Results   Component Value Date     (H) 01/26/2019     Procedure    TTE 2/26/2025 EF 40 to 45%, DD EF, LVH, LAE, mild to moderate MR, TAA 4.3 cm    TTE  1/16/24 EF 50-55%, DDF, LAE, Mild MR, TAA 4.3 cm    Echo 9/14/2021 EF 50%, DDF, mild pulm  hypertension, thoracic aorta 4.1 cm    VENOUS U/S [03/08/2019]: Negative for DVT     CTA (03/07/2019): Pulm embolism w/in subsegmental branch of R-lower lobe pulm. artery, New from (1/26/19) New subsegmental 3-in-bud ground glass opacities in L-lung suspicious for developing viral/atypical pneumonia. Status post L-chest wall pacemaker/AICD w/out definitive evidence Pulm arterial pressure. Stable nonspecific mediastinal/hilar adenopathy.     ECHO (01/28/2019): Normal LVF 55-60% EF. Mildly elev RVSP. AR measures 3.7 cm. Arterial septal aneurysm prsent.     CTA [01/26/2019]: Aorta 3.9 cm, persistent mediastinal lymphadenopathy     ECHO [09/2017]: EF 50%, aortic sclerosis, aorta 4.2 cm     ECHO [01/17/2014]: Normal left ventricular size with concentric left ventricular hypertrophy and normal left ventricular systolic function. Estimated left ventricular ejection fraction is 60%. Mild left atrial enlargement. Mild aortic stenosis with a trileaflet aortic valve with trace regurgitation. Dilated ascending aorta, measuring 4.3 centimeters. Mild pulmonary hypertension.     EX NST [09/26/2014]: 10 min 56 sec (13.2 METs) ... Normal, EF 54%          Assessment/Plan   1. Complete heart block. Left bundle branch block. Status post pacemaker placement 2019. Doing well. Followed by Dr. Ramicone.     2. Thoracic aortic aneurysm. 3.9 cm on the CT scan 4.2 cm on echo.  His repeat echo 2021 September revealed a 4 cm aneurysm. TTE 2/26/2025 EF 40 to 45%, DD EF, LVH, LAE, mild to moderate MR, TAA 4.3 cm    3. Aortic stenosis. Mostly sclerosis. Not noted on his echo 1/2024.      4. Hyperlipidemia. Followed by Dr. Agarwal.  8/10/2023 LDL 68  triglycerides 68     5. Hypertension. BP excellent despite 6# wt gain.      6. Pulmonary embolism. This was discovered incidentally in March 2019 on a CT scan ordered by pulmonary medicine.  Currently off of Xarelto per pulmonary    7.  Cardiomyopathy.  Ejection fraction is not 40 to 45%.  Etiology  unclear.  This may be related to RV pacing, for possibly his left bundle branch block.  I have asked for him to have an attempt at an exercise nuclear stress test.  We may not be able to do this because of his back and may need to be regadenoson.    RTC 4 months stress test sometime in the next couple months          Nikos Tripathi MD     Instructions and follow up

## 2025-04-30 ENCOUNTER — OFFICE VISIT (OUTPATIENT)
Dept: PAIN MEDICINE | Facility: CLINIC | Age: 65
End: 2025-04-30
Payer: MEDICARE

## 2025-04-30 VITALS
SYSTOLIC BLOOD PRESSURE: 136 MMHG | HEIGHT: 70 IN | RESPIRATION RATE: 16 BRPM | WEIGHT: 239 LBS | OXYGEN SATURATION: 99 % | DIASTOLIC BLOOD PRESSURE: 78 MMHG | BODY MASS INDEX: 34.22 KG/M2 | HEART RATE: 78 BPM | TEMPERATURE: 98.1 F

## 2025-04-30 DIAGNOSIS — M54.16 LUMBAR NEURITIS: Primary | ICD-10-CM

## 2025-04-30 DIAGNOSIS — M51.362 DEGENERATION OF INTERVERTEBRAL DISC OF LUMBAR REGION WITH DISCOGENIC BACK PAIN AND LOWER EXTREMITY PAIN: ICD-10-CM

## 2025-04-30 PROCEDURE — 99214 OFFICE O/P EST MOD 30 MIN: CPT | Performed by: ANESTHESIOLOGY

## 2025-04-30 RX ORDER — LIDOCAINE 50 MG/G
1 PATCH TOPICAL DAILY
Qty: 30 PATCH | Refills: 3 | Status: SHIPPED | OUTPATIENT
Start: 2025-04-30

## 2025-04-30 RX ORDER — GABAPENTIN 300 MG/1
300 CAPSULE ORAL 3 TIMES DAILY
Qty: 60 CAPSULE | Refills: 11 | Status: SHIPPED | OUTPATIENT
Start: 2025-04-30 | End: 2025-05-30

## 2025-04-30 ASSESSMENT — ENCOUNTER SYMPTOMS
OCCASIONAL FEELINGS OF UNSTEADINESS: 1
LOSS OF SENSATION IN FEET: 1

## 2025-04-30 ASSESSMENT — PAIN SCALES - GENERAL
PAINLEVEL_OUTOF10: 8
PAINLEVEL_OUTOF10: 8

## 2025-04-30 ASSESSMENT — PAIN - FUNCTIONAL ASSESSMENT: PAIN_FUNCTIONAL_ASSESSMENT: 0-10

## 2025-04-30 ASSESSMENT — PAIN DESCRIPTION - DESCRIPTORS: DESCRIPTORS: STABBING

## 2025-04-30 NOTE — PROGRESS NOTES
History Of Present Illness  Christopher Ralph is a 64 y.o. male presenting with   Chief Complaint   Patient presents with    Follow-up       Patient presents with complaints of chronic low back pain to the right hip/buttock and groin area. The pain is constant, worse with standing/walking and better with sitting. The pain is sharp, stabbing and shooting to the RLE. Denies LE paresthesias, weakness, saddle anesthesia, bowel or bladder incontinence. To manage this pain the patient has attempted Diclofenac with no relief of his pain. The patient has undergone PT at [facility]. The patients chronic HTN on ASA has Pacemaker are stable on medication management.     PAIN SCORE: 7/10.    PCP: Dr. Agarwal  R: Dr. Ho  Cards: Dr. Tripathi     Past Medical History  He has a past medical history of Aortic stenosis (12/21/2023), Bilateral leg edema (01/03/2024), Complete heart block (12/21/2023), Dilated cardiomyopathy (Multi) (04/09/2025), Left bundle branch block (LBBB) (12/21/2023), Overweight, Pacemaker (12/21/2023), Personal history of other diseases of the digestive system, Personal history of other specified conditions, Pulmonary embolism without acute cor pulmonale (12/21/2023), Thoracic aortic aneurysm (12/21/2023), and Thoracic aortic aneurysm, without rupture, unspecified (08/26/2021).    Surgical History  He has a past surgical history that includes Appendectomy (10/02/2017) and Other surgical history (08/12/2021).     Social History  He reports that he quit smoking about 4 years ago. His smoking use included cigarettes. He started smoking about 34 years ago. He has a 7.5 pack-year smoking history. He has been exposed to tobacco smoke. He has never used smokeless tobacco. He reports current alcohol use. He reports that he does not use drugs.    Family History  No family history on file.     Allergies  Patient has no known allergies.    Review of Systems    All other systems reviewed and negative for any deficits. Pertinent  positives and negatives were considered in the medical decision making process.        Physical Exam  /78 (BP Location: Right arm, Patient Position: Sitting, BP Cuff Size: Adult long)   Pulse 78   Temp 36.7 °C (98.1 °F) (Temporal)   Resp 16   Wt 108 kg (239 lb)   SpO2 99%     General: Pt appears stated age    Eyes: Conjunctiva non-icteric and lids without obvious rash or drooping. Pupils are symmetric.    ENT: External ears and nose appear to be without deformity or rash. No lesions or masses noted. Hearing is grossly intact.    Respiratory: No gasping or shortness of breath noted. No use of accessory muscles noted.    CVS: Extremities show no edema or varicosities    Skin: No rashes or open lesions/ulcers identified on skin. No induration/tightening noted with palpation of skin.     Musculoskeletal: [place] is grossly normal.    Stability: No subluxation noted on movement of bilateral upper extremities or head/neck.     Strength: 3/5 in RLE and 4/5 in LLE     Range of Motion: WNL    Neurologic: Reflexes 2+    Sensation: dec to sharp touch along L5 dermatome    Neurologic: Cranial Nerves II thru XII are grossly intact    Psychiatric: Pt is alert and oriented to time, person, and place           Assessment/Plan   1. Degeneration of intervertebral disc of lumbar region with discogenic back pain and lower extremity pain        2. Lumbar neuritis              1.I have provided the patient with a list of physical therapy exercises to learn and perform to strengthen core, maintain stabilization, and reduce pain. We reviewed the exercises in detail and I encouraged them to perform them on a regular basis.    He has completed a 6 week course of therapy at [facility] in [place]  He also completed a 6 week course of therapy at [facility] in January of 2024    2. I would recommend the pt INCREASE HIS Gabapentin 300mg TID to help with nerve related pain. We discussed the risks, benefits, and side effects to this medication including  the mechanism of action and the pt understands and agrees.    3. I extensively reviewed the patients CT findings (11-9-2024) in detail, including review of the actual images and provided a detailed explanation of the findings using a spine model.     There is noted worsening spondylolisthesis of L4 on L5 and a disc herniation at this level as well.     4. The patient is a candidate for an LESI L5/S1 to treat low back and radicular pain. I spent time with the patient discussing the risks, benefits, and alternatives to this measure including, but not limited to worsening pain with injection, no improvement/guarantee with the procedure, numbness in the lower extremity and risk of falls post procedure, vagal reaction/ hypotension, feeling dizzy / lightheaded, spinal infection, worsening pre-existing infections, epidural hematoma, epidural abscess, nerve injury, paralysis, spinal fluid leak and spinal headache. The patient understands all of these risks and agrees to proceed with the planned procedure.     We will contact the patients PCP to determine if it is safe to stop ASA 7 days prior to procedure. If Pt is to be bridged on Lovenox they will need to be off of Lovenox for 24 hours prior to the procedure.  We did discuss the risks for stopping anticoagulation including, but not limited to any cardiovascular event, embolism, and even death. The patient understands these risks and would like to proceed with the procedure.    His last injection was on 1- and he reported 85% relief of his pain that lasted for 1.5 months.     5. Given the severity of his x-ray findings and that he has failed over 6 weeks of PT twice and 6 weeks of Diclofenac, Tylenol, Advil with no relief I would recommend a CT scan of the lumbar spine.         Sarath Marquez MD    I spent time with the patient reviewing their imaging and discussing the risks benefits and alternatives to the above plan. A total of 30 minutes was spent reviewing the  data and greater than 50% of that time was with the patient during the face to face encounter discussing treatment options both surgical, non-surgical, and minimally invasive techniques.

## 2025-05-14 ENCOUNTER — HOSPITAL ENCOUNTER (OUTPATIENT)
Dept: CARDIOLOGY | Facility: CLINIC | Age: 65
Discharge: HOME | End: 2025-05-14
Payer: MEDICARE

## 2025-05-14 DIAGNOSIS — Z95.0 PRESENCE OF CARDIAC PACEMAKER: ICD-10-CM

## 2025-05-14 DIAGNOSIS — I44.2 COMPLETE HEART BLOCK: ICD-10-CM

## 2025-05-14 PROCEDURE — 93294 REM INTERROG EVL PM/LDLS PM: CPT | Performed by: INTERNAL MEDICINE

## 2025-05-14 PROCEDURE — 93296 REM INTERROG EVL PM/IDS: CPT

## 2025-06-06 ENCOUNTER — HOSPITAL ENCOUNTER (OUTPATIENT)
Dept: PAIN MEDICINE | Facility: CLINIC | Age: 65
Discharge: HOME | End: 2025-06-06
Payer: MEDICARE

## 2025-06-06 VITALS
DIASTOLIC BLOOD PRESSURE: 75 MMHG | RESPIRATION RATE: 15 BRPM | HEART RATE: 86 BPM | TEMPERATURE: 98.6 F | WEIGHT: 240 LBS | BODY MASS INDEX: 33.6 KG/M2 | OXYGEN SATURATION: 96 % | HEIGHT: 71 IN | SYSTOLIC BLOOD PRESSURE: 143 MMHG

## 2025-06-06 DIAGNOSIS — M54.16 LUMBAR NEURITIS: ICD-10-CM

## 2025-06-06 PROCEDURE — 62323 NJX INTERLAMINAR LMBR/SAC: CPT | Performed by: ANESTHESIOLOGY

## 2025-06-06 PROCEDURE — 2500000005 HC RX 250 GENERAL PHARMACY W/O HCPCS: Performed by: ANESTHESIOLOGY

## 2025-06-06 PROCEDURE — 2500000004 HC RX 250 GENERAL PHARMACY W/ HCPCS (ALT 636 FOR OP/ED): Performed by: ANESTHESIOLOGY

## 2025-06-06 RX ORDER — SODIUM CHLORIDE 9 MG/ML
INJECTION, SOLUTION INTRAMUSCULAR; INTRAVENOUS; SUBCUTANEOUS AS NEEDED
Status: COMPLETED | OUTPATIENT
Start: 2025-06-06 | End: 2025-06-06

## 2025-06-06 RX ORDER — LIDOCAINE HYDROCHLORIDE 5 MG/ML
INJECTION, SOLUTION INFILTRATION; INTRAVENOUS AS NEEDED
Status: COMPLETED | OUTPATIENT
Start: 2025-06-06 | End: 2025-06-06

## 2025-06-06 RX ORDER — TRIAMCINOLONE ACETONIDE 40 MG/ML
INJECTION, SUSPENSION INTRA-ARTICULAR; INTRAMUSCULAR AS NEEDED
Status: COMPLETED | OUTPATIENT
Start: 2025-06-06 | End: 2025-06-06

## 2025-06-06 RX ADMIN — LIDOCAINE HYDROCHLORIDE 5 ML: 5 INJECTION, SOLUTION INFILTRATION at 13:47

## 2025-06-06 RX ADMIN — SODIUM CHLORIDE 5 ML: 9 INJECTION, SOLUTION INTRAMUSCULAR; INTRAVENOUS; SUBCUTANEOUS at 13:47

## 2025-06-06 RX ADMIN — TRIAMCINOLONE ACETONIDE 40 MG: 40 INJECTION, SUSPENSION INTRA-ARTICULAR; INTRAMUSCULAR at 13:47

## 2025-06-06 ASSESSMENT — PAIN SCALES - GENERAL
PAINLEVEL_OUTOF10: 8
PAINLEVEL_OUTOF10: 3

## 2025-06-06 ASSESSMENT — PAIN - FUNCTIONAL ASSESSMENT
PAIN_FUNCTIONAL_ASSESSMENT: 0-10
PAIN_FUNCTIONAL_ASSESSMENT: 0-10

## 2025-06-06 ASSESSMENT — ACTIVITIES OF DAILY LIVING (ADL): EFFECT OF PAIN ON DAILY ACTIVITIES: WALKING

## 2025-06-06 ASSESSMENT — PATIENT HEALTH QUESTIONNAIRE - PHQ9
1. LITTLE INTEREST OR PLEASURE IN DOING THINGS: NOT AT ALL
SUM OF ALL RESPONSES TO PHQ9 QUESTIONS 1 AND 2: 0
2. FEELING DOWN, DEPRESSED OR HOPELESS: NOT AT ALL

## 2025-06-06 ASSESSMENT — PAIN DESCRIPTION - DESCRIPTORS: DESCRIPTORS: TIGHTNESS

## 2025-06-13 ENCOUNTER — APPOINTMENT (OUTPATIENT)
Dept: PAIN MEDICINE | Facility: CLINIC | Age: 65
End: 2025-06-13
Payer: MEDICARE

## 2025-07-09 ENCOUNTER — APPOINTMENT (OUTPATIENT)
Dept: RADIOLOGY | Facility: CLINIC | Age: 65
End: 2025-07-09
Payer: MEDICARE

## 2025-07-09 ENCOUNTER — HOSPITAL ENCOUNTER (OUTPATIENT)
Dept: RADIOLOGY | Facility: HOSPITAL | Age: 65
Discharge: HOME | End: 2025-07-09
Payer: MEDICARE

## 2025-07-09 ENCOUNTER — APPOINTMENT (OUTPATIENT)
Dept: CARDIOLOGY | Facility: CLINIC | Age: 65
End: 2025-07-09
Payer: MEDICARE

## 2025-07-09 ENCOUNTER — HOSPITAL ENCOUNTER (OUTPATIENT)
Dept: CARDIOLOGY | Facility: HOSPITAL | Age: 65
Discharge: HOME | End: 2025-07-09
Payer: MEDICARE

## 2025-07-09 DIAGNOSIS — I25.5 ISCHEMIC CARDIOMYOPATHY: ICD-10-CM

## 2025-07-09 DIAGNOSIS — I42.0 DILATED CARDIOMYOPATHY (MULTI): Chronic | ICD-10-CM

## 2025-07-09 DIAGNOSIS — I44.7 LEFT BUNDLE BRANCH BLOCK (LBBB): Chronic | ICD-10-CM

## 2025-07-09 DIAGNOSIS — I10 BENIGN HYPERTENSION: Chronic | ICD-10-CM

## 2025-07-09 PROCEDURE — 3430000001 HC RX 343 DIAGNOSTIC RADIOPHARMACEUTICALS: Performed by: INTERNAL MEDICINE

## 2025-07-09 PROCEDURE — 78452 HT MUSCLE IMAGE SPECT MULT: CPT | Performed by: INTERNAL MEDICINE

## 2025-07-09 PROCEDURE — 93017 CV STRESS TEST TRACING ONLY: CPT

## 2025-07-09 PROCEDURE — 93016 CV STRESS TEST SUPVJ ONLY: CPT | Performed by: INTERNAL MEDICINE

## 2025-07-09 PROCEDURE — 78452 HT MUSCLE IMAGE SPECT MULT: CPT

## 2025-07-09 PROCEDURE — 93018 CV STRESS TEST I&R ONLY: CPT | Performed by: INTERNAL MEDICINE

## 2025-07-09 PROCEDURE — A9502 TC99M TETROFOSMIN: HCPCS | Performed by: INTERNAL MEDICINE

## 2025-07-09 RX ADMIN — TETROFOSMIN 10 MILLICURIE: 0.23 INJECTION, POWDER, LYOPHILIZED, FOR SOLUTION INTRAVENOUS at 08:50

## 2025-07-09 RX ADMIN — TETROFOSMIN 35.8 MILLICURIE: 0.23 INJECTION, POWDER, LYOPHILIZED, FOR SOLUTION INTRAVENOUS at 10:00

## 2025-07-30 ENCOUNTER — APPOINTMENT (OUTPATIENT)
Dept: CARDIOLOGY | Facility: CLINIC | Age: 65
End: 2025-07-30
Payer: MEDICARE

## 2025-07-30 ENCOUNTER — OFFICE VISIT (OUTPATIENT)
Dept: CARDIOLOGY | Facility: CLINIC | Age: 65
End: 2025-07-30
Payer: MEDICARE

## 2025-07-30 VITALS
WEIGHT: 250.4 LBS | BODY MASS INDEX: 35.06 KG/M2 | HEIGHT: 71 IN | SYSTOLIC BLOOD PRESSURE: 128 MMHG | DIASTOLIC BLOOD PRESSURE: 76 MMHG | HEART RATE: 88 BPM

## 2025-07-30 DIAGNOSIS — I44.2 COMPLETE HEART BLOCK: Chronic | ICD-10-CM

## 2025-07-30 DIAGNOSIS — I44.7 LEFT BUNDLE BRANCH BLOCK (LBBB): Chronic | ICD-10-CM

## 2025-07-30 DIAGNOSIS — I42.0 DILATED CARDIOMYOPATHY (MULTI): Chronic | ICD-10-CM

## 2025-07-30 DIAGNOSIS — I25.5 ISCHEMIC CARDIOMYOPATHY: ICD-10-CM

## 2025-07-30 PROCEDURE — 99214 OFFICE O/P EST MOD 30 MIN: CPT | Performed by: PHYSICIAN ASSISTANT

## 2025-07-30 PROCEDURE — 3008F BODY MASS INDEX DOCD: CPT | Performed by: PHYSICIAN ASSISTANT

## 2025-07-30 PROCEDURE — 1160F RVW MEDS BY RX/DR IN RCRD: CPT | Performed by: PHYSICIAN ASSISTANT

## 2025-07-30 PROCEDURE — 1159F MED LIST DOCD IN RCRD: CPT | Performed by: PHYSICIAN ASSISTANT

## 2025-07-30 PROCEDURE — 3078F DIAST BP <80 MM HG: CPT | Performed by: PHYSICIAN ASSISTANT

## 2025-07-30 PROCEDURE — 3074F SYST BP LT 130 MM HG: CPT | Performed by: PHYSICIAN ASSISTANT

## 2025-07-30 PROCEDURE — 99212 OFFICE O/P EST SF 10 MIN: CPT

## 2025-07-30 NOTE — PROGRESS NOTES
"Chief Complaint:   Aortic dilatation, complete heart block     History Of Present Illness:    07/30/25  Repeat 2D echo displayed new mild reduction in LVEF 40-45%, patient subsequently completed an exercise nuclear stress test displaying normal myocardial perfusion, stress LVEF 45%.  Patient remains completely asymptomatic at this point without progressive GREENE nor classic anginal symptoms.  Patient denies chest pain, chest pressure, palpitations, dyspnea on exertion, shortness of breath at rest, diaphoresis, nausea/vomiting, back pain, headache, lightheadedness, dizziness, syncope or presyncopal episodes, active bleeding signs or symptoms, excessive weight gain, muscle or joint pain, claudication.    3/13/24  Christopher Ralph is a 64 y.o. male presenting after recently completing a 2D echo displaying normal LV systolic function with moderately dilated LA, persistent mild dilatation of the thoracic aorta 4.3cm at largest diameter.  Patient denies chest pain, chest pressure, palpitations, dyspnea on exertion, shortness of breath at rest, diaphoresis, nausea/vomiting, back pain, headache, lightheadedness, dizziness, syncope or presyncopal episodes, active bleeding signs or symptoms, excessive weight gain, muscle or joint pain, claudication.       Last Recorded Vitals:  Vitals:    07/30/25 1352   BP: 128/76   BP Location: Left arm   Patient Position: Sitting   BP Cuff Size: Adult   Pulse: 88   Weight: 114 kg (250 lb 6.4 oz)   Height: 1.803 m (5' 11\")       Past Medical History:  He has a past medical history of Aortic stenosis (12/21/2023), Bilateral leg edema (01/03/2024), Complete heart block (12/21/2023), Dilated cardiomyopathy (Multi) (04/09/2025), Hypertension, Left bundle branch block (LBBB) (12/21/2023), Overweight, Pacemaker (12/21/2023), Personal history of other diseases of the digestive system, Personal history of other specified conditions, Pulmonary embolism without acute cor pulmonale (12/21/2023), Thoracic " aortic aneurysm (12/21/2023), and Thoracic aortic aneurysm, without rupture, unspecified (08/26/2021).    Past Surgical History:  He has a past surgical history that includes Appendectomy (10/02/2017) and Other surgical history (08/12/2021).      Social History:  He reports that he quit smoking about 4 years ago. His smoking use included cigarettes. He started smoking about 34 years ago. He has a 7.5 pack-year smoking history. He has been exposed to tobacco smoke. He has never used smokeless tobacco. He reports current alcohol use. He reports that he does not use drugs.    Family History:  No family history on file.     Allergies:  Patient has no known allergies.    Outpatient Medications:  Current Outpatient Medications   Medication Instructions    amLODIPine (NORVASC) 10 mg, Daily    aspirin 81 mg, Once    furosemide (LASIX) 20 mg, oral, Daily    gabapentin (NEURONTIN) 300 mg, oral, 3 times daily    hydroCHLOROthiazide (MICROZIDE) 12.5 mg, oral, Daily    lidocaine (Lidoderm) 5 % patch 1 patch, transdermal, Daily, Remove & discard patch within 12 hours or as directed by MD.    losartan (COZAAR) 100 mg, Daily    pantoprazole (PROTONIX) 40 mg, Daily       Physical Exam:  Constitutional: awake and alert, oriented ×3, no apparent distress  Skin: warm, dry, good turgor no obvious lesions  Eyes: pupils equal, round, reactive to light, conjunctiva pink and noninjected, no discharge  HENT: normocephalic and atraumatic, mucous membranes moist, trachea midline with no masses/goiter  Cardiovascular: S1/S2 regular, no murmur no rubs/gallops, no carotid bruits, no JVD  Pulmonary: symmetrical chest expansion, lungs are clear to auscultation bilaterally, no wheezes/rales/rhonchi, normal effort  Abdomen: nontender, nondistended, active bowel sounds, no ascites  Extremities: no cyanosis, clubbing, no LE edema no lesions; palpable pedal pulses  Neurologic: cranial nerves II - XII grossly intact, stable gait, no tremor       Last  Labs:  CBC -  Lab Results   Component Value Date    WBC 6.5 02/21/2024    HGB 15.4 02/21/2024    HCT 45.2 02/21/2024    MCV 94 02/21/2024     02/21/2024       CMP -  Lab Results   Component Value Date    CALCIUM 9.6 02/21/2024    PHOS 3.3 01/26/2019    PROT 6.9 02/21/2024    ALBUMIN 4.2 02/21/2024    AST 19 02/21/2024    ALT 19 02/21/2024    ALKPHOS 105 02/21/2024    BILITOT 0.9 02/21/2024       LIPID PANEL -   Lab Results   Component Value Date    CHOL 184 02/21/2024    TRIG 65 02/21/2024    HDL 91.8 02/21/2024    CHHDL 2.0 02/21/2024    LDLF 68 08/10/2023    VLDL 13 02/21/2024    NHDL 92 02/21/2024       RENAL FUNCTION PANEL -   Lab Results   Component Value Date    GLUCOSE 90 02/21/2024     02/21/2024    K 4.8 02/21/2024     02/21/2024    CO2 24 02/21/2024    ANIONGAP 17 02/21/2024    BUN 20 02/21/2024    CREATININE 1.18 02/21/2024    GFRMALE 83 08/10/2023    CALCIUM 9.6 02/21/2024    PHOS 3.3 01/26/2019    ALBUMIN 4.2 02/21/2024        Lab Results   Component Value Date     (H) 01/26/2019       Last Cardiology Tests:  ECG:  ECG 12 Lead 01/03/2024      Echo:  Transthoracic Echo (TTE) Complete 01/16/2024  1. Left ventricular systolic function is normal with a 50-55% estimated ejection fraction.   2. Abnormal septal motion consistent with RV pacemaker.   3. Spectral Doppler shows an impaired relaxation pattern of left ventricular diastolic filling.   4. The left atrium is moderately dilated.   5. Mild mitral valve regurgitation.   6. Mildly elevated RVSP.   7. The thoracic aorta is mildly dilated measuring 4.3cm.    Ejection Fractions:  EF   Date/Time Value Ref Range Status   02/26/2025 09:00 AM 43 %        Cath:  No results found for this or any previous visit from the past 1095 days.      Stress Test:  No results found for this or any previous visit from the past 1095 days.      Cardiac Imaging:  No results found for this or any previous visit from the past 1095  days.      Assessment/Plan   Problem List Items Addressed This Visit           ICD-10-CM       Cardiac and Vasculature    Complete heart block (Chronic) I44.2    Left bundle branch block (LBBB) (Chronic) I44.7    Dilated cardiomyopathy (Multi) (Chronic) I42.0     Other Visit Diagnoses         Codes      Ischemic cardiomyopathy     I25.5            -As discussed in HPI    -S/P negative exercise nuclear stress test for ischemia, therefore unclear as to etiology regarding patient's mild LVSF reduction    -Continue PPM interrogation as scheduled with device clinic    -Mildly dilated ascending aorta, continue surveillance as per Dr. Tripathi    -F/U with Dr. Tripathi and Dr. Ramicone as scheduled      Rosendo Trevizo PA-C

## 2025-08-01 ENCOUNTER — OFFICE VISIT (OUTPATIENT)
Dept: URGENT CARE | Age: 65
End: 2025-08-01
Payer: MEDICARE

## 2025-08-01 ENCOUNTER — ANCILLARY PROCEDURE (OUTPATIENT)
Dept: URGENT CARE | Age: 65
End: 2025-08-01
Payer: MEDICARE

## 2025-08-01 VITALS
BODY MASS INDEX: 34.44 KG/M2 | HEART RATE: 96 BPM | DIASTOLIC BLOOD PRESSURE: 89 MMHG | HEIGHT: 71 IN | OXYGEN SATURATION: 97 % | TEMPERATURE: 97.6 F | SYSTOLIC BLOOD PRESSURE: 152 MMHG | WEIGHT: 246 LBS | RESPIRATION RATE: 16 BRPM

## 2025-08-01 DIAGNOSIS — S29.9XXA RIB INJURY: Primary | ICD-10-CM

## 2025-08-01 DIAGNOSIS — S20.212A BRUISED RIBS, LEFT, INITIAL ENCOUNTER: ICD-10-CM

## 2025-08-01 DIAGNOSIS — S29.9XXA RIB INJURY: ICD-10-CM

## 2025-08-01 PROCEDURE — 71101 X-RAY EXAM UNILAT RIBS/CHEST: CPT | Mod: LEFT SIDE | Performed by: PHYSICIAN ASSISTANT

## 2025-08-01 RX ORDER — MELOXICAM 7.5 MG/1
7.5 TABLET ORAL AS NEEDED
Qty: 14 TABLET | Refills: 0 | Status: SHIPPED | OUTPATIENT
Start: 2025-08-01 | End: 2025-08-15

## 2025-08-01 ASSESSMENT — PATIENT HEALTH QUESTIONNAIRE - PHQ9
2. FEELING DOWN, DEPRESSED OR HOPELESS: NOT AT ALL
1. LITTLE INTEREST OR PLEASURE IN DOING THINGS: NOT AT ALL
SUM OF ALL RESPONSES TO PHQ9 QUESTIONS 1 AND 2: 0

## 2025-08-01 ASSESSMENT — PAIN SCALES - GENERAL: PAINLEVEL_OUTOF10: 7

## 2025-08-01 NOTE — LETTER
August 1, 2025     Patient: Christopher Ralph   YOB: 1960   Date of Visit: 8/1/2025       To Whom It May Concern:    Christopher Ralph was seen in my clinic on 8/1/2025 at 1:15 pm. Please excuse Christopher for his absence from work on this day to make the appointment. Pt. May return to work on 8/2/2025 but should avoid picking up more than 10-20 lbs for the next week. Pt. Should take 10 deep breaths an hour for 1 week. Pt. Should be allowed to apply a pillow to the area with ROM of the chest for the next 3-5 days.    If you have any questions or concerns, please don't hesitate to call.         Sincerely,         Lucy Catalan PA-C        CC: No Recipients

## 2025-08-01 NOTE — PATIENT INSTRUCTIONS
Pt. Is advised to take 10 to 15 slow deep breaths at least 4 times each day. My try holding a pillow to your chest when taking deep breaths, sneeze, cough, or laugh to help with the pain. May try sleeping head and shoulders propped up on pillows to help with pain.  Try applying cold compresses/ice to the area.  Try NSAIDs/Tylenol as needed for pain.  Monitor for signs of worsening pain, dizziness, shortness of breath, coughing up blood, signs of infection such as fevers, chills, nausea, vomiting and if these occur proceed to the ED.

## 2025-08-01 NOTE — PROGRESS NOTES
"Subjective   Patient ID: Christopher Ralph is a 64 y.o. male. They present today with a chief complaint of Injury (Patient states got tangled up with dog landed on left arm armpit rib area, states pain with movement ).    History of Present Illness    Injury    64-year-old patient presents to clinic with complaints of left anterior rib pain with associated ecchymosis ongoing since last night after patient got tangled up with the dog and then fell forward bracing fall with outstretched hand and landing on anterior left chest.  Reports has no arm, elbow, wrist, hand pain.   Reports the pain is sharp and rated at 7 out of 10 with ROM or deep breaths.  Reports no history of previous fracture to the area.  Denies shortness of breath, dizziness, numbness, tingling, hemoptysis.     Past Medical History  Allergies as of 08/01/2025    (No Known Allergies)       Prescriptions Prior to Admission[1]     Medical History[2]    Surgical History[3]     reports that he quit smoking about 4 years ago. His smoking use included cigarettes. He started smoking about 34 years ago. He has a 7.5 pack-year smoking history. He has been exposed to tobacco smoke. He has never used smokeless tobacco. He reports current alcohol use. He reports that he does not use drugs.    Review of Systems  Review of Systems     ROS negative with the exception as noted on HPI                           Objective    Vitals:    08/01/25 1310 08/01/25 1315 08/01/25 1354   BP: (!) 176/102 (!) 160/98 152/89   BP Location:  Right arm    Patient Position:  Sitting    Pulse: 96     Resp: 16     Temp: 36.4 °C (97.6 °F)     SpO2: 97%     Weight: 112 kg (246 lb)     Height: 1.803 m (5' 11\")       No LMP for male patient.    Physical Exam  Constitutional:       Appearance: Normal appearance.   HENT:      Head: Normocephalic and atraumatic.     Cardiovascular:      Rate and Rhythm: Normal rate and regular rhythm.      Pulses: Normal pulses.      Heart sounds: Normal heart " sounds.   Pulmonary:      Effort: Pulmonary effort is normal. No respiratory distress.      Breath sounds: Normal breath sounds. No stridor. No wheezing, rhonchi or rales.   Chest:      Chest wall: Tenderness (ecchymosis of anterior proximal chest. tender at anterior left ribs 2-4) present.     Musculoskeletal:      Right upper arm: No swelling, tenderness or bony tenderness.      Left upper arm: No swelling, tenderness or bony tenderness.      Right elbow: No swelling. Normal range of motion. No tenderness.      Left elbow: No swelling. Normal range of motion. No tenderness.      Right wrist: No swelling, tenderness, bony tenderness, snuff box tenderness or crepitus. Normal range of motion. Normal pulse.      Left wrist: No swelling, tenderness, bony tenderness, snuff box tenderness or crepitus. Normal range of motion. Normal pulse.      Right hand: No swelling, tenderness or bony tenderness. Normal range of motion. Normal strength. Normal sensation. Normal capillary refill. Normal pulse.      Left hand: No swelling, tenderness or bony tenderness. Normal range of motion. Normal strength. Normal sensation. Normal capillary refill. Normal pulse.     Neurological:      Mental Status: He is alert.      Sensory: No sensory deficit.      Motor: No weakness.      Deep Tendon Reflexes: Reflexes normal.         Procedures    Point of Care Test & Imaging Results from this visit  No results found for this visit on 08/01/25.   Imaging  XR ribs 2 views left w chest pa or ap  Result Date: 8/1/2025  No rib fracture. Minimal linear atelectasis left lung base.     MACRO: None   Signed by: Stephen Jarrett 8/1/2025 2:37 PM Dictation workstation:   [id number]      Cardiology, Vascular, and Other Imaging  No other imaging results found for the past 2 days      Diagnostic study results (if any) were reviewed by Lucy Catalan PA-C.    Assessment/Plan   Allergies, medications, history, and pertinent labs/EKGs/Imaging reviewed by  Lucy Catalan PA-C.   left anterior rib pain with associated ecchymosis ongoing since last night after patient got tangled up with the dog and then fell forward bracing fall with outstretched hand and landing on anterior left chest. Pt. Is advised to take 10 to 15 slow deep breaths at least 4 times each day. My try holding a pillow to your chest when taking deep breaths, sneeze, cough, or laugh to help with the pain. May try sleeping head and shoulders propped up on pillows to help with pain.  Try applying cold compresses/ice to the area.  Try NSAIDs/Tylenol as needed for pain.  Monitor for signs of worsening pain, dizziness, shortness of breath, coughing up blood, signs of infection such as fevers, chills, nausea, vomiting and if these occur proceed to the ED.  Risk, benefits, and potential side effects of medication(s) discussed with pt. Discussed disease/illness presentation, treatment options, progression, complications, and outcomes with patient. Pt. Has expressed understanding and is an agreement of plan of care.     Medical Decision Making      Orders and Diagnoses  Diagnoses and all orders for this visit:  Rib injury  -     XR ribs 2 views left w chest pa or ap; Future  Bruised ribs, left, initial encounter  -     meloxicam (Mobic) 7.5 mg tablet; Take 1 tablet (7.5 mg) by mouth if needed for moderate pain (4 - 6) (once daily as needed) for up to 14 days.      Medical Admin Record      Patient disposition: Home    Electronically signed by Lucy Catalan PA-C  5:16 PM           [1] (Not in a hospital admission)   [2]   Past Medical History:  Diagnosis Date    Aortic stenosis 12/21/2023    mild    Bilateral leg edema 01/03/2024    Complete heart block 12/21/2023    Dilated cardiomyopathy (Multi) 04/09/2025    EF 40-45%      Hypertension     Left bundle branch block (LBBB) 12/21/2023    Overweight     Overweight (BMI 25.0-29.9)    Pacemaker 12/21/2023    [employer] … implanted January 2019 for  complete heart block    Personal history of other diseases of the digestive system     History of gastroesophageal reflux (GERD)    Personal history of other specified conditions     History of shortness of breath    Pulmonary embolism without acute cor pulmonale 12/21/2023    Found incidentally on CAT scan ordered by Pulmonary    Thoracic aortic aneurysm 12/21/2023    4 cm     Thoracic aortic aneurysm, without rupture, unspecified 08/26/2021    Thoracic aortic aneurysm   [3]   Past Surgical History:  Procedure Laterality Date    APPENDECTOMY  10/02/2017    Appendectomy    OTHER SURGICAL HISTORY  08/12/2021    Pacemaker insertion

## 2025-08-05 ENCOUNTER — APPOINTMENT (OUTPATIENT)
Dept: CARDIOLOGY | Facility: CLINIC | Age: 65
End: 2025-08-05
Payer: MEDICARE

## 2025-08-13 ENCOUNTER — HOSPITAL ENCOUNTER (OUTPATIENT)
Dept: CARDIOLOGY | Facility: CLINIC | Age: 65
Discharge: HOME | End: 2025-08-13
Payer: MEDICARE

## 2025-08-13 DIAGNOSIS — Z95.0 PRESENCE OF CARDIAC PACEMAKER: ICD-10-CM

## 2025-08-13 DIAGNOSIS — I44.2 COMPLETE HEART BLOCK: ICD-10-CM

## 2025-08-13 PROCEDURE — 93296 REM INTERROG EVL PM/IDS: CPT

## 2025-08-15 ENCOUNTER — OFFICE VISIT (OUTPATIENT)
Dept: URGENT CARE | Age: 65
End: 2025-08-15
Payer: MEDICARE

## 2025-08-15 VITALS
SYSTOLIC BLOOD PRESSURE: 150 MMHG | RESPIRATION RATE: 18 BRPM | DIASTOLIC BLOOD PRESSURE: 81 MMHG | OXYGEN SATURATION: 95 % | TEMPERATURE: 98.3 F | HEART RATE: 85 BPM

## 2025-08-15 DIAGNOSIS — M70.21 OLECRANON BURSITIS OF RIGHT ELBOW: Primary | ICD-10-CM

## 2025-08-15 RX ORDER — METHYLPREDNISOLONE 4 MG/1
TABLET ORAL
Qty: 21 TABLET | Refills: 0 | Status: SHIPPED | OUTPATIENT
Start: 2025-08-15 | End: 2025-08-22

## 2025-08-27 ENCOUNTER — OFFICE VISIT (OUTPATIENT)
Dept: ORTHOPEDIC SURGERY | Facility: CLINIC | Age: 65
End: 2025-08-27
Payer: MEDICARE

## 2025-08-27 VITALS — WEIGHT: 245 LBS | HEIGHT: 70 IN | BODY MASS INDEX: 35.07 KG/M2

## 2025-08-27 DIAGNOSIS — M70.21 OLECRANON BURSITIS OF RIGHT ELBOW: ICD-10-CM

## 2025-08-27 DIAGNOSIS — M25.521 RIGHT ELBOW PAIN: Primary | ICD-10-CM

## 2025-08-27 PROCEDURE — 1160F RVW MEDS BY RX/DR IN RCRD: CPT | Performed by: ORTHOPAEDIC SURGERY

## 2025-08-27 PROCEDURE — 99212 OFFICE O/P EST SF 10 MIN: CPT | Performed by: ORTHOPAEDIC SURGERY

## 2025-08-27 PROCEDURE — 20605 DRAIN/INJ JOINT/BURSA W/O US: CPT | Mod: RT | Performed by: ORTHOPAEDIC SURGERY

## 2025-08-27 PROCEDURE — 99203 OFFICE O/P NEW LOW 30 MIN: CPT | Performed by: ORTHOPAEDIC SURGERY

## 2025-08-27 PROCEDURE — 1036F TOBACCO NON-USER: CPT | Performed by: ORTHOPAEDIC SURGERY

## 2025-08-27 PROCEDURE — 3008F BODY MASS INDEX DOCD: CPT | Performed by: ORTHOPAEDIC SURGERY

## 2025-08-27 PROCEDURE — 1159F MED LIST DOCD IN RCRD: CPT | Performed by: ORTHOPAEDIC SURGERY

## 2025-12-08 ENCOUNTER — APPOINTMENT (OUTPATIENT)
Dept: CARDIOLOGY | Facility: CLINIC | Age: 65
End: 2025-12-08
Payer: MEDICARE

## 2026-03-04 ENCOUNTER — APPOINTMENT (OUTPATIENT)
Dept: CARDIOLOGY | Facility: CLINIC | Age: 66
End: 2026-03-04
Payer: MEDICARE